# Patient Record
Sex: MALE | Race: BLACK OR AFRICAN AMERICAN | Employment: FULL TIME | URBAN - METROPOLITAN AREA
[De-identification: names, ages, dates, MRNs, and addresses within clinical notes are randomized per-mention and may not be internally consistent; named-entity substitution may affect disease eponyms.]

---

## 2017-04-18 RX ORDER — AMLODIPINE BESYLATE 5 MG/1
TABLET ORAL
Qty: 30 TABLET | Refills: 0 | Status: SHIPPED | OUTPATIENT
Start: 2017-04-18 | End: 2017-05-18 | Stop reason: SDUPTHER

## 2017-04-18 RX ORDER — OLMESARTAN MEDOXOMIL AND HYDROCHLOROTHIAZIDE 40/25 40; 25 MG/1; MG/1
TABLET ORAL
Qty: 30 TABLET | Refills: 0 | Status: SHIPPED | OUTPATIENT
Start: 2017-04-18 | End: 2017-05-18 | Stop reason: SDUPTHER

## 2017-04-18 RX ORDER — ATORVASTATIN CALCIUM 20 MG/1
TABLET, FILM COATED ORAL
Qty: 30 TABLET | Refills: 0 | Status: SHIPPED | OUTPATIENT
Start: 2017-04-18 | End: 2017-05-18 | Stop reason: SDUPTHER

## 2017-04-25 ENCOUNTER — OFFICE VISIT (OUTPATIENT)
Dept: INTERNAL MEDICINE CLINIC | Age: 57
End: 2017-04-25

## 2017-04-25 VITALS
HEIGHT: 72 IN | BODY MASS INDEX: 35.35 KG/M2 | WEIGHT: 261 LBS | HEART RATE: 74 BPM | TEMPERATURE: 97.8 F | SYSTOLIC BLOOD PRESSURE: 138 MMHG | DIASTOLIC BLOOD PRESSURE: 84 MMHG | OXYGEN SATURATION: 96 %

## 2017-04-25 DIAGNOSIS — I10 ESSENTIAL HYPERTENSION: Primary | ICD-10-CM

## 2017-04-25 DIAGNOSIS — R73.9 HYPERGLYCEMIA: ICD-10-CM

## 2017-04-25 DIAGNOSIS — E78.2 MIXED HYPERLIPIDEMIA: ICD-10-CM

## 2017-04-25 DIAGNOSIS — G47.33 OBSTRUCTIVE SLEEP APNEA SYNDROME: ICD-10-CM

## 2017-04-25 DIAGNOSIS — Z80.42 FAMILY HISTORY OF PROSTATE CANCER: ICD-10-CM

## 2017-04-25 LAB
BILIRUBIN URINE: NEGATIVE
BLOOD, URINE: NEGATIVE
CLARITY: CLEAR
COLOR: YELLOW
GLUCOSE URINE: NEGATIVE MG/DL
KETONES, URINE: NEGATIVE MG/DL
LEUKOCYTE ESTERASE, URINE: NEGATIVE
MICROSCOPIC EXAMINATION: NORMAL
NITRITE, URINE: NEGATIVE
PH UA: 6.5
PROTEIN UA: NEGATIVE MG/DL
SPECIFIC GRAVITY UA: 1.02
URINE TYPE: NORMAL
UROBILINOGEN, URINE: 1 E.U./DL

## 2017-04-25 PROCEDURE — 99214 OFFICE O/P EST MOD 30 MIN: CPT | Performed by: INTERNAL MEDICINE

## 2017-04-25 ASSESSMENT — ENCOUNTER SYMPTOMS
GASTROINTESTINAL NEGATIVE: 1
EYES NEGATIVE: 1
RESPIRATORY NEGATIVE: 1

## 2017-04-25 ASSESSMENT — PATIENT HEALTH QUESTIONNAIRE - PHQ9
SUM OF ALL RESPONSES TO PHQ9 QUESTIONS 1 & 2: 0
2. FEELING DOWN, DEPRESSED OR HOPELESS: 0
SUM OF ALL RESPONSES TO PHQ QUESTIONS 1-9: 0
1. LITTLE INTEREST OR PLEASURE IN DOING THINGS: 0

## 2017-04-26 LAB
A/G RATIO: 1.7 (ref 1.1–2.2)
ALBUMIN SERPL-MCNC: 4.5 G/DL (ref 3.4–5)
ALP BLD-CCNC: 92 U/L (ref 40–129)
ALT SERPL-CCNC: 47 U/L (ref 10–40)
ANION GAP SERPL CALCULATED.3IONS-SCNC: 14 MMOL/L (ref 3–16)
AST SERPL-CCNC: 30 U/L (ref 15–37)
BILIRUB SERPL-MCNC: 0.5 MG/DL (ref 0–1)
BUN BLDV-MCNC: 16 MG/DL (ref 7–20)
CALCIUM SERPL-MCNC: 9.9 MG/DL (ref 8.3–10.6)
CHLORIDE BLD-SCNC: 99 MMOL/L (ref 99–110)
CHOLESTEROL, TOTAL: 143 MG/DL (ref 0–199)
CO2: 30 MMOL/L (ref 21–32)
CREAT SERPL-MCNC: 1.1 MG/DL (ref 0.9–1.3)
ESTIMATED AVERAGE GLUCOSE: 128.4 MG/DL
GFR AFRICAN AMERICAN: >60
GFR NON-AFRICAN AMERICAN: >60
GLOBULIN: 2.7 G/DL
GLUCOSE BLD-MCNC: 108 MG/DL (ref 70–99)
HBA1C MFR BLD: 6.1 %
HDLC SERPL-MCNC: 31 MG/DL (ref 40–60)
LDL CHOLESTEROL CALCULATED: 68 MG/DL
POTASSIUM SERPL-SCNC: 3.8 MMOL/L (ref 3.5–5.1)
PROSTATE SPECIFIC ANTIGEN: 3.82 NG/ML (ref 0–4)
SODIUM BLD-SCNC: 143 MMOL/L (ref 136–145)
TOTAL PROTEIN: 7.2 G/DL (ref 6.4–8.2)
TRIGL SERPL-MCNC: 221 MG/DL (ref 0–150)
VLDLC SERPL CALC-MCNC: 44 MG/DL

## 2017-04-27 ENCOUNTER — TELEPHONE (OUTPATIENT)
Dept: INTERNAL MEDICINE CLINIC | Age: 57
End: 2017-04-27

## 2017-04-27 DIAGNOSIS — G47.33 OBSTRUCTIVE SLEEP APNEA SYNDROME: Primary | ICD-10-CM

## 2017-05-19 RX ORDER — AMLODIPINE BESYLATE 5 MG/1
TABLET ORAL
Qty: 30 TABLET | Refills: 3 | Status: SHIPPED | OUTPATIENT
Start: 2017-05-19 | End: 2017-09-26 | Stop reason: SDUPTHER

## 2017-05-19 RX ORDER — ATORVASTATIN CALCIUM 20 MG/1
TABLET, FILM COATED ORAL
Qty: 30 TABLET | Refills: 3 | Status: SHIPPED | OUTPATIENT
Start: 2017-05-19 | End: 2017-09-26 | Stop reason: SDUPTHER

## 2017-05-19 RX ORDER — OLMESARTAN MEDOXOMIL AND HYDROCHLOROTHIAZIDE 40/25 40; 25 MG/1; MG/1
TABLET ORAL
Qty: 30 TABLET | Refills: 3 | Status: SHIPPED | OUTPATIENT
Start: 2017-05-19 | End: 2017-09-26

## 2017-09-26 ENCOUNTER — TELEPHONE (OUTPATIENT)
Dept: INTERNAL MEDICINE CLINIC | Age: 57
End: 2017-09-26

## 2017-09-26 ENCOUNTER — OFFICE VISIT (OUTPATIENT)
Dept: INTERNAL MEDICINE CLINIC | Age: 57
End: 2017-09-26

## 2017-09-26 VITALS
WEIGHT: 259 LBS | DIASTOLIC BLOOD PRESSURE: 90 MMHG | BODY MASS INDEX: 35.08 KG/M2 | HEIGHT: 72 IN | OXYGEN SATURATION: 98 % | HEART RATE: 73 BPM | SYSTOLIC BLOOD PRESSURE: 150 MMHG

## 2017-09-26 DIAGNOSIS — I10 ESSENTIAL HYPERTENSION: Primary | ICD-10-CM

## 2017-09-26 DIAGNOSIS — E11.9 TYPE 2 DIABETES MELLITUS WITHOUT COMPLICATION, WITHOUT LONG-TERM CURRENT USE OF INSULIN (HCC): ICD-10-CM

## 2017-09-26 DIAGNOSIS — R73.9 HYPERGLYCEMIA: ICD-10-CM

## 2017-09-26 DIAGNOSIS — E78.2 MIXED HYPERLIPIDEMIA: ICD-10-CM

## 2017-09-26 LAB
A/G RATIO: 1.4 (ref 1.1–2.2)
ALBUMIN SERPL-MCNC: 4.6 G/DL (ref 3.4–5)
ALP BLD-CCNC: 87 U/L (ref 40–129)
ALT SERPL-CCNC: 52 U/L (ref 10–40)
ANION GAP SERPL CALCULATED.3IONS-SCNC: 16 MMOL/L (ref 3–16)
AST SERPL-CCNC: 38 U/L (ref 15–37)
BILIRUB SERPL-MCNC: 0.6 MG/DL (ref 0–1)
BUN BLDV-MCNC: 16 MG/DL (ref 7–20)
CALCIUM SERPL-MCNC: 10.2 MG/DL (ref 8.3–10.6)
CHLORIDE BLD-SCNC: 96 MMOL/L (ref 99–110)
CHOLESTEROL, TOTAL: 141 MG/DL (ref 0–199)
CO2: 30 MMOL/L (ref 21–32)
CREAT SERPL-MCNC: 1.2 MG/DL (ref 0.9–1.3)
GFR AFRICAN AMERICAN: >60
GFR NON-AFRICAN AMERICAN: >60
GLOBULIN: 3.4 G/DL
GLUCOSE BLD-MCNC: 99 MG/DL (ref 70–99)
HDLC SERPL-MCNC: 33 MG/DL (ref 40–60)
HEPATITIS C ANTIBODY INTERPRETATION: NORMAL
LDL CHOLESTEROL CALCULATED: 75 MG/DL
POTASSIUM SERPL-SCNC: 4.4 MMOL/L (ref 3.5–5.1)
SODIUM BLD-SCNC: 142 MMOL/L (ref 136–145)
TOTAL PROTEIN: 8 G/DL (ref 6.4–8.2)
TRIGL SERPL-MCNC: 167 MG/DL (ref 0–150)
VLDLC SERPL CALC-MCNC: 33 MG/DL

## 2017-09-26 PROCEDURE — 99214 OFFICE O/P EST MOD 30 MIN: CPT | Performed by: INTERNAL MEDICINE

## 2017-09-26 RX ORDER — ATORVASTATIN CALCIUM 20 MG/1
20 TABLET, FILM COATED ORAL DAILY
Qty: 30 TABLET | Refills: 5 | Status: SHIPPED | OUTPATIENT
Start: 2017-09-26 | End: 2018-04-03 | Stop reason: SDUPTHER

## 2017-09-26 RX ORDER — AMLODIPINE BESYLATE 5 MG/1
5 TABLET ORAL DAILY
Qty: 30 TABLET | Refills: 5 | Status: SHIPPED | OUTPATIENT
Start: 2017-09-26 | End: 2018-04-03 | Stop reason: SDUPTHER

## 2017-09-26 ASSESSMENT — ENCOUNTER SYMPTOMS
GASTROINTESTINAL NEGATIVE: 1
EYES NEGATIVE: 1
RESPIRATORY NEGATIVE: 1

## 2017-09-27 LAB
ESTIMATED AVERAGE GLUCOSE: 128.4 MG/DL
HBA1C MFR BLD: 6.1 %

## 2017-09-29 RX ORDER — OLMESARTAN MEDOXOMIL AND HYDROCHLOROTHIAZIDE 40/25 40; 25 MG/1; MG/1
TABLET ORAL
Qty: 30 TABLET | Refills: 0 | Status: SHIPPED | OUTPATIENT
Start: 2017-09-29 | End: 2017-10-28 | Stop reason: SDUPTHER

## 2017-10-30 RX ORDER — OLMESARTAN MEDOXOMIL AND HYDROCHLOROTHIAZIDE 40/25 40; 25 MG/1; MG/1
TABLET ORAL
Qty: 30 TABLET | Refills: 0 | Status: SHIPPED | OUTPATIENT
Start: 2017-10-30 | End: 2017-11-28 | Stop reason: SDUPTHER

## 2017-11-07 ENCOUNTER — TELEPHONE (OUTPATIENT)
Dept: INTERNAL MEDICINE CLINIC | Age: 57
End: 2017-11-07

## 2017-11-07 NOTE — TELEPHONE ENCOUNTER
Patient needs a new CPAP machine     [facility] patients is requesting a new order   documentation showing he's been using and benefiting from the machine and why he needs a new machine

## 2017-11-17 ENCOUNTER — TELEPHONE (OUTPATIENT)
Dept: INTERNAL MEDICINE CLINIC | Age: 57
End: 2017-11-17

## 2017-12-01 ENCOUNTER — TELEPHONE (OUTPATIENT)
Dept: INTERNAL MEDICINE CLINIC | Age: 57
End: 2017-12-01

## 2017-12-01 RX ORDER — OLMESARTAN MEDOXOMIL AND HYDROCHLOROTHIAZIDE 40/25 40; 25 MG/1; MG/1
TABLET ORAL
Qty: 30 TABLET | Refills: 0 | Status: SHIPPED | OUTPATIENT
Start: 2017-12-01 | End: 2017-12-30 | Stop reason: SDUPTHER

## 2017-12-01 NOTE — TELEPHONE ENCOUNTER
Patient needs a refill on the following medication   olmesartan-hydrochlorothiazide (BENICAR HCT) 40-25 MG per tablet

## 2017-12-21 ENCOUNTER — OFFICE VISIT (OUTPATIENT)
Dept: PULMONOLOGY | Age: 57
End: 2017-12-21

## 2017-12-21 VITALS
SYSTOLIC BLOOD PRESSURE: 130 MMHG | DIASTOLIC BLOOD PRESSURE: 88 MMHG | HEART RATE: 79 BPM | RESPIRATION RATE: 16 BRPM | WEIGHT: 261 LBS | OXYGEN SATURATION: 97 % | HEIGHT: 72 IN | BODY MASS INDEX: 35.35 KG/M2

## 2017-12-21 DIAGNOSIS — G47.33 OBSTRUCTIVE SLEEP APNEA SYNDROME: Primary | ICD-10-CM

## 2017-12-21 PROCEDURE — 99203 OFFICE O/P NEW LOW 30 MIN: CPT | Performed by: INTERNAL MEDICINE

## 2018-01-02 RX ORDER — OLMESARTAN MEDOXOMIL AND HYDROCHLOROTHIAZIDE 40/25 40; 25 MG/1; MG/1
TABLET ORAL
Qty: 30 TABLET | Refills: 0 | Status: SHIPPED | OUTPATIENT
Start: 2018-01-02 | End: 2018-01-09 | Stop reason: SDUPTHER

## 2018-01-09 ENCOUNTER — OFFICE VISIT (OUTPATIENT)
Dept: INTERNAL MEDICINE CLINIC | Age: 58
End: 2018-01-09

## 2018-01-09 VITALS
DIASTOLIC BLOOD PRESSURE: 100 MMHG | OXYGEN SATURATION: 96 % | SYSTOLIC BLOOD PRESSURE: 134 MMHG | HEART RATE: 79 BPM | WEIGHT: 261 LBS | HEIGHT: 72 IN | BODY MASS INDEX: 35.35 KG/M2

## 2018-01-09 DIAGNOSIS — Z83.3 FAMILY HISTORY OF DIABETES MELLITUS: ICD-10-CM

## 2018-01-09 DIAGNOSIS — E78.2 MIXED HYPERLIPIDEMIA: ICD-10-CM

## 2018-01-09 DIAGNOSIS — I10 ESSENTIAL HYPERTENSION: Primary | ICD-10-CM

## 2018-01-09 DIAGNOSIS — Z80.42 FAMILY HISTORY OF PROSTATE CANCER: ICD-10-CM

## 2018-01-09 DIAGNOSIS — R73.9 HYPERGLYCEMIA: ICD-10-CM

## 2018-01-09 LAB
BILIRUBIN URINE: NEGATIVE
BLOOD, URINE: NEGATIVE
CLARITY: CLEAR
COLOR: YELLOW
GLUCOSE URINE: NEGATIVE MG/DL
KETONES, URINE: NEGATIVE MG/DL
LEUKOCYTE ESTERASE, URINE: NEGATIVE
MICROSCOPIC EXAMINATION: NORMAL
NITRITE, URINE: NEGATIVE
PH UA: 6
PROTEIN UA: NEGATIVE MG/DL
SPECIFIC GRAVITY UA: 1.02
URINE TYPE: NORMAL
UROBILINOGEN, URINE: 1 E.U./DL

## 2018-01-09 PROCEDURE — 99214 OFFICE O/P EST MOD 30 MIN: CPT | Performed by: INTERNAL MEDICINE

## 2018-01-09 RX ORDER — OLMESARTAN MEDOXOMIL AND HYDROCHLOROTHIAZIDE 40/25 40; 25 MG/1; MG/1
1 TABLET ORAL DAILY
Qty: 30 TABLET | Refills: 5 | Status: SHIPPED | OUTPATIENT
Start: 2018-01-09 | End: 2018-08-10 | Stop reason: SDUPTHER

## 2018-01-09 ASSESSMENT — ENCOUNTER SYMPTOMS
RESPIRATORY NEGATIVE: 1
GASTROINTESTINAL NEGATIVE: 1
EYES NEGATIVE: 1

## 2018-01-10 LAB
ESTIMATED AVERAGE GLUCOSE: 131.2 MG/DL
HBA1C MFR BLD: 6.2 %

## 2018-01-18 ENCOUNTER — TELEPHONE (OUTPATIENT)
Dept: PULMONOLOGY | Age: 58
End: 2018-01-18

## 2018-04-04 RX ORDER — AMLODIPINE BESYLATE 5 MG/1
5 TABLET ORAL DAILY
Qty: 30 TABLET | Refills: 0 | Status: SHIPPED | OUTPATIENT
Start: 2018-04-04 | End: 2018-05-05 | Stop reason: SDUPTHER

## 2018-04-04 RX ORDER — ATORVASTATIN CALCIUM 20 MG/1
20 TABLET, FILM COATED ORAL DAILY
Qty: 30 TABLET | Refills: 0 | Status: SHIPPED | OUTPATIENT
Start: 2018-04-04 | End: 2018-05-05 | Stop reason: SDUPTHER

## 2018-05-07 RX ORDER — ATORVASTATIN CALCIUM 20 MG/1
20 TABLET, FILM COATED ORAL DAILY
Qty: 30 TABLET | Refills: 0 | Status: SHIPPED | OUTPATIENT
Start: 2018-05-07 | End: 2018-06-07 | Stop reason: SDUPTHER

## 2018-05-07 RX ORDER — AMLODIPINE BESYLATE 5 MG/1
5 TABLET ORAL DAILY
Qty: 30 TABLET | Refills: 0 | Status: SHIPPED | OUTPATIENT
Start: 2018-05-07 | End: 2018-06-07 | Stop reason: SDUPTHER

## 2018-05-09 ENCOUNTER — OFFICE VISIT (OUTPATIENT)
Dept: INTERNAL MEDICINE CLINIC | Age: 58
End: 2018-05-09

## 2018-05-09 VITALS
SYSTOLIC BLOOD PRESSURE: 160 MMHG | OXYGEN SATURATION: 95 % | BODY MASS INDEX: 35.62 KG/M2 | HEART RATE: 73 BPM | DIASTOLIC BLOOD PRESSURE: 100 MMHG | HEIGHT: 72 IN | WEIGHT: 263 LBS

## 2018-05-09 DIAGNOSIS — G47.33 OBSTRUCTIVE SLEEP APNEA SYNDROME: ICD-10-CM

## 2018-05-09 DIAGNOSIS — E78.2 MIXED HYPERLIPIDEMIA: ICD-10-CM

## 2018-05-09 DIAGNOSIS — R73.9 HYPERGLYCEMIA: ICD-10-CM

## 2018-05-09 DIAGNOSIS — I10 ESSENTIAL HYPERTENSION: Primary | ICD-10-CM

## 2018-05-09 LAB
A/G RATIO: 1.7 (ref 1.1–2.2)
ALBUMIN SERPL-MCNC: 4.8 G/DL (ref 3.4–5)
ALP BLD-CCNC: 80 U/L (ref 40–129)
ALT SERPL-CCNC: 48 U/L (ref 10–40)
ANION GAP SERPL CALCULATED.3IONS-SCNC: 14 MMOL/L (ref 3–16)
AST SERPL-CCNC: 35 U/L (ref 15–37)
BILIRUB SERPL-MCNC: 0.7 MG/DL (ref 0–1)
BUN BLDV-MCNC: 13 MG/DL (ref 7–20)
CALCIUM SERPL-MCNC: 9.9 MG/DL (ref 8.3–10.6)
CHLORIDE BLD-SCNC: 99 MMOL/L (ref 99–110)
CO2: 30 MMOL/L (ref 21–32)
CREAT SERPL-MCNC: 1.1 MG/DL (ref 0.9–1.3)
GFR AFRICAN AMERICAN: >60
GFR NON-AFRICAN AMERICAN: >60
GLOBULIN: 2.8 G/DL
GLUCOSE BLD-MCNC: 111 MG/DL (ref 70–99)
POTASSIUM SERPL-SCNC: 3.7 MMOL/L (ref 3.5–5.1)
SODIUM BLD-SCNC: 143 MMOL/L (ref 136–145)
TOTAL PROTEIN: 7.6 G/DL (ref 6.4–8.2)

## 2018-05-09 PROCEDURE — 99214 OFFICE O/P EST MOD 30 MIN: CPT | Performed by: INTERNAL MEDICINE

## 2018-05-09 RX ORDER — SPIRONOLACTONE 25 MG/1
25 TABLET ORAL DAILY
Qty: 30 TABLET | Refills: 3 | Status: SHIPPED | OUTPATIENT
Start: 2018-05-09 | End: 2018-09-07 | Stop reason: SDUPTHER

## 2018-05-09 ASSESSMENT — ENCOUNTER SYMPTOMS
EYES NEGATIVE: 1
GASTROINTESTINAL NEGATIVE: 1
RESPIRATORY NEGATIVE: 1

## 2018-05-09 ASSESSMENT — PATIENT HEALTH QUESTIONNAIRE - PHQ9
SUM OF ALL RESPONSES TO PHQ QUESTIONS 1-9: 0
2. FEELING DOWN, DEPRESSED OR HOPELESS: 0
SUM OF ALL RESPONSES TO PHQ9 QUESTIONS 1 & 2: 0
1. LITTLE INTEREST OR PLEASURE IN DOING THINGS: 0

## 2018-05-10 LAB
ESTIMATED AVERAGE GLUCOSE: 131.2 MG/DL
HBA1C MFR BLD: 6.2 %

## 2018-06-08 RX ORDER — AMLODIPINE BESYLATE 5 MG/1
5 TABLET ORAL DAILY
Qty: 30 TABLET | Refills: 0 | Status: SHIPPED | OUTPATIENT
Start: 2018-06-08 | End: 2018-07-08 | Stop reason: SDUPTHER

## 2018-06-08 RX ORDER — ATORVASTATIN CALCIUM 20 MG/1
20 TABLET, FILM COATED ORAL DAILY
Qty: 30 TABLET | Refills: 0 | Status: SHIPPED | OUTPATIENT
Start: 2018-06-08 | End: 2018-07-08 | Stop reason: SDUPTHER

## 2018-07-10 RX ORDER — ATORVASTATIN CALCIUM 20 MG/1
20 TABLET, FILM COATED ORAL DAILY
Qty: 30 TABLET | Refills: 5 | Status: SHIPPED | OUTPATIENT
Start: 2018-07-10 | End: 2019-01-12 | Stop reason: SDUPTHER

## 2018-07-10 RX ORDER — AMLODIPINE BESYLATE 5 MG/1
5 TABLET ORAL DAILY
Qty: 30 TABLET | Refills: 5 | Status: SHIPPED | OUTPATIENT
Start: 2018-07-10 | End: 2019-01-12 | Stop reason: SDUPTHER

## 2018-08-10 RX ORDER — OLMESARTAN MEDOXOMIL AND HYDROCHLOROTHIAZIDE 40/25 40; 25 MG/1; MG/1
1 TABLET ORAL DAILY
Qty: 30 TABLET | Refills: 0 | Status: SHIPPED | OUTPATIENT
Start: 2018-08-10 | End: 2018-09-07 | Stop reason: SDUPTHER

## 2018-08-13 RX ORDER — OLMESARTAN MEDOXOMIL AND HYDROCHLOROTHIAZIDE 40/25 40; 25 MG/1; MG/1
1 TABLET ORAL DAILY
Qty: 30 TABLET | Refills: 0 | Status: SHIPPED | OUTPATIENT
Start: 2018-08-13 | End: 2021-11-19 | Stop reason: SDUPTHER

## 2018-09-10 RX ORDER — SPIRONOLACTONE 25 MG/1
25 TABLET ORAL DAILY
Qty: 30 TABLET | Refills: 0 | Status: SHIPPED | OUTPATIENT
Start: 2018-09-10 | End: 2018-10-09 | Stop reason: SDUPTHER

## 2018-09-10 RX ORDER — OLMESARTAN MEDOXOMIL AND HYDROCHLOROTHIAZIDE 40/25 40; 25 MG/1; MG/1
1 TABLET ORAL DAILY
Qty: 30 TABLET | Refills: 0 | Status: SHIPPED | OUTPATIENT
Start: 2018-09-10 | End: 2018-09-12

## 2018-09-12 ENCOUNTER — OFFICE VISIT (OUTPATIENT)
Dept: INTERNAL MEDICINE CLINIC | Age: 58
End: 2018-09-12

## 2018-09-12 VITALS
BODY MASS INDEX: 33.59 KG/M2 | WEIGHT: 248 LBS | HEART RATE: 80 BPM | SYSTOLIC BLOOD PRESSURE: 130 MMHG | OXYGEN SATURATION: 98 % | HEIGHT: 72 IN | DIASTOLIC BLOOD PRESSURE: 88 MMHG

## 2018-09-12 DIAGNOSIS — E78.2 MIXED HYPERLIPIDEMIA: ICD-10-CM

## 2018-09-12 DIAGNOSIS — R73.9 HYPERGLYCEMIA: ICD-10-CM

## 2018-09-12 DIAGNOSIS — I10 ESSENTIAL HYPERTENSION: ICD-10-CM

## 2018-09-12 DIAGNOSIS — R73.9 HYPERGLYCEMIA: Primary | ICD-10-CM

## 2018-09-12 LAB
ALBUMIN SERPL-MCNC: 4.9 G/DL (ref 3.4–5)
ANION GAP SERPL CALCULATED.3IONS-SCNC: 16 MMOL/L (ref 3–16)
BILIRUBIN URINE: NEGATIVE
BLOOD, URINE: NEGATIVE
BUN BLDV-MCNC: 15 MG/DL (ref 7–20)
CALCIUM SERPL-MCNC: 10.4 MG/DL (ref 8.3–10.6)
CHLORIDE BLD-SCNC: 96 MMOL/L (ref 99–110)
CLARITY: CLEAR
CO2: 28 MMOL/L (ref 21–32)
COLOR: YELLOW
CREAT SERPL-MCNC: 1.1 MG/DL (ref 0.9–1.3)
GFR AFRICAN AMERICAN: >60
GFR NON-AFRICAN AMERICAN: >60
GLUCOSE BLD-MCNC: 83 MG/DL (ref 70–99)
GLUCOSE URINE: NEGATIVE MG/DL
KETONES, URINE: NEGATIVE MG/DL
LEUKOCYTE ESTERASE, URINE: NEGATIVE
MICROSCOPIC EXAMINATION: NORMAL
NITRITE, URINE: NEGATIVE
PH UA: 6.5
PHOSPHORUS: 4.5 MG/DL (ref 2.5–4.9)
POTASSIUM SERPL-SCNC: 4.1 MMOL/L (ref 3.5–5.1)
PROTEIN UA: NEGATIVE MG/DL
SODIUM BLD-SCNC: 140 MMOL/L (ref 136–145)
SPECIFIC GRAVITY UA: 1.02
URINE TYPE: NORMAL
UROBILINOGEN, URINE: 0.2 E.U./DL

## 2018-09-12 PROCEDURE — 99214 OFFICE O/P EST MOD 30 MIN: CPT | Performed by: INTERNAL MEDICINE

## 2018-09-12 ASSESSMENT — PATIENT HEALTH QUESTIONNAIRE - PHQ9
1. LITTLE INTEREST OR PLEASURE IN DOING THINGS: 0
SUM OF ALL RESPONSES TO PHQ QUESTIONS 1-9: 0
2. FEELING DOWN, DEPRESSED OR HOPELESS: 0
SUM OF ALL RESPONSES TO PHQ9 QUESTIONS 1 & 2: 0
SUM OF ALL RESPONSES TO PHQ QUESTIONS 1-9: 0

## 2018-09-12 ASSESSMENT — ENCOUNTER SYMPTOMS
RESPIRATORY NEGATIVE: 1
EYES NEGATIVE: 1
GASTROINTESTINAL NEGATIVE: 1

## 2018-09-13 LAB
ESTIMATED AVERAGE GLUCOSE: 128.4 MG/DL
HBA1C MFR BLD: 6.1 %

## 2018-10-09 RX ORDER — SPIRONOLACTONE 25 MG/1
25 TABLET ORAL DAILY
Qty: 30 TABLET | Refills: 0 | Status: SHIPPED | OUTPATIENT
Start: 2018-10-09 | End: 2018-11-07 | Stop reason: SDUPTHER

## 2018-10-09 RX ORDER — OLMESARTAN MEDOXOMIL AND HYDROCHLOROTHIAZIDE 40/25 40; 25 MG/1; MG/1
1 TABLET ORAL DAILY
Qty: 30 TABLET | Refills: 0 | Status: SHIPPED | OUTPATIENT
Start: 2018-10-09 | End: 2018-11-07 | Stop reason: SDUPTHER

## 2018-11-09 RX ORDER — SPIRONOLACTONE 25 MG/1
25 TABLET ORAL DAILY
Qty: 30 TABLET | Refills: 0 | Status: SHIPPED | OUTPATIENT
Start: 2018-11-09 | End: 2018-12-11 | Stop reason: SDUPTHER

## 2018-11-09 RX ORDER — OLMESARTAN MEDOXOMIL AND HYDROCHLOROTHIAZIDE 40/25 40; 25 MG/1; MG/1
1 TABLET ORAL DAILY
Qty: 30 TABLET | Refills: 0 | Status: SHIPPED | OUTPATIENT
Start: 2018-11-09 | End: 2018-12-11 | Stop reason: SDUPTHER

## 2018-12-12 RX ORDER — OLMESARTAN MEDOXOMIL AND HYDROCHLOROTHIAZIDE 40/25 40; 25 MG/1; MG/1
1 TABLET ORAL DAILY
Qty: 30 TABLET | Refills: 0 | Status: SHIPPED | OUTPATIENT
Start: 2018-12-12 | End: 2019-01-12 | Stop reason: SDUPTHER

## 2018-12-12 RX ORDER — SPIRONOLACTONE 25 MG/1
25 TABLET ORAL DAILY
Qty: 30 TABLET | Refills: 0 | Status: SHIPPED | OUTPATIENT
Start: 2018-12-12 | End: 2019-01-12 | Stop reason: SDUPTHER

## 2018-12-17 ENCOUNTER — OFFICE VISIT (OUTPATIENT)
Dept: PULMONOLOGY | Age: 58
End: 2018-12-17
Payer: COMMERCIAL

## 2018-12-17 VITALS
OXYGEN SATURATION: 97 % | WEIGHT: 253.3 LBS | SYSTOLIC BLOOD PRESSURE: 136 MMHG | HEART RATE: 80 BPM | DIASTOLIC BLOOD PRESSURE: 90 MMHG | BODY MASS INDEX: 34.31 KG/M2 | HEIGHT: 72 IN | RESPIRATION RATE: 18 BRPM

## 2018-12-17 DIAGNOSIS — G47.33 OBSTRUCTIVE SLEEP APNEA SYNDROME: Primary | ICD-10-CM

## 2018-12-17 PROCEDURE — 99213 OFFICE O/P EST LOW 20 MIN: CPT | Performed by: INTERNAL MEDICINE

## 2019-01-14 ENCOUNTER — TELEPHONE (OUTPATIENT)
Dept: INTERNAL MEDICINE CLINIC | Age: 59
End: 2019-01-14

## 2019-02-20 ENCOUNTER — OFFICE VISIT (OUTPATIENT)
Dept: INTERNAL MEDICINE CLINIC | Age: 59
End: 2019-02-20
Payer: COMMERCIAL

## 2019-02-20 VITALS
HEART RATE: 88 BPM | DIASTOLIC BLOOD PRESSURE: 86 MMHG | HEIGHT: 72 IN | OXYGEN SATURATION: 98 % | SYSTOLIC BLOOD PRESSURE: 130 MMHG | WEIGHT: 256 LBS | BODY MASS INDEX: 34.67 KG/M2

## 2019-02-20 DIAGNOSIS — R73.9 HYPERGLYCEMIA: ICD-10-CM

## 2019-02-20 DIAGNOSIS — E11.9 TYPE 2 DIABETES MELLITUS WITHOUT COMPLICATION, WITHOUT LONG-TERM CURRENT USE OF INSULIN (HCC): ICD-10-CM

## 2019-02-20 DIAGNOSIS — I10 ESSENTIAL HYPERTENSION: ICD-10-CM

## 2019-02-20 DIAGNOSIS — Z12.5 PROSTATE CANCER SCREENING: ICD-10-CM

## 2019-02-20 DIAGNOSIS — E78.2 MIXED HYPERLIPIDEMIA: ICD-10-CM

## 2019-02-20 DIAGNOSIS — I10 ESSENTIAL HYPERTENSION: Primary | ICD-10-CM

## 2019-02-20 LAB
A/G RATIO: 1.6 (ref 1.1–2.2)
ALBUMIN SERPL-MCNC: 4.7 G/DL (ref 3.4–5)
ALP BLD-CCNC: 75 U/L (ref 40–129)
ALT SERPL-CCNC: 61 U/L (ref 10–40)
ANION GAP SERPL CALCULATED.3IONS-SCNC: 16 MMOL/L (ref 3–16)
AST SERPL-CCNC: 45 U/L (ref 15–37)
BILIRUB SERPL-MCNC: 0.5 MG/DL (ref 0–1)
BILIRUBIN URINE: NEGATIVE
BLOOD, URINE: NEGATIVE
BUN BLDV-MCNC: 15 MG/DL (ref 7–20)
CALCIUM SERPL-MCNC: 10.1 MG/DL (ref 8.3–10.6)
CHLORIDE BLD-SCNC: 101 MMOL/L (ref 99–110)
CLARITY: CLEAR
CO2: 29 MMOL/L (ref 21–32)
COLOR: YELLOW
CREAT SERPL-MCNC: 1.1 MG/DL (ref 0.9–1.3)
GFR AFRICAN AMERICAN: >60
GFR NON-AFRICAN AMERICAN: >60
GLOBULIN: 3 G/DL
GLUCOSE BLD-MCNC: 104 MG/DL (ref 70–99)
GLUCOSE URINE: NEGATIVE MG/DL
KETONES, URINE: NEGATIVE MG/DL
LEUKOCYTE ESTERASE, URINE: NEGATIVE
MICROSCOPIC EXAMINATION: NORMAL
NITRITE, URINE: NEGATIVE
PH UA: 7
POTASSIUM SERPL-SCNC: 4.4 MMOL/L (ref 3.5–5.1)
PROSTATE SPECIFIC ANTIGEN: 7.47 NG/ML (ref 0–4)
PROTEIN UA: NEGATIVE MG/DL
SODIUM BLD-SCNC: 146 MMOL/L (ref 136–145)
SPECIFIC GRAVITY UA: 1.02
TOTAL PROTEIN: 7.7 G/DL (ref 6.4–8.2)
URINE TYPE: NORMAL
UROBILINOGEN, URINE: 1 E.U./DL

## 2019-02-20 PROCEDURE — 99214 OFFICE O/P EST MOD 30 MIN: CPT | Performed by: INTERNAL MEDICINE

## 2019-02-20 ASSESSMENT — PATIENT HEALTH QUESTIONNAIRE - PHQ9
2. FEELING DOWN, DEPRESSED OR HOPELESS: 0
SUM OF ALL RESPONSES TO PHQ9 QUESTIONS 1 & 2: 0
1. LITTLE INTEREST OR PLEASURE IN DOING THINGS: 0
SUM OF ALL RESPONSES TO PHQ QUESTIONS 1-9: 0
SUM OF ALL RESPONSES TO PHQ QUESTIONS 1-9: 0

## 2019-02-20 ASSESSMENT — ENCOUNTER SYMPTOMS
EYES NEGATIVE: 1
RESPIRATORY NEGATIVE: 1
GASTROINTESTINAL NEGATIVE: 1

## 2019-02-21 LAB
ESTIMATED AVERAGE GLUCOSE: 139.9 MG/DL
HBA1C MFR BLD: 6.5 %

## 2019-05-01 DIAGNOSIS — R97.20 ELEVATED PSA: Primary | ICD-10-CM

## 2019-05-08 ENCOUNTER — TELEPHONE (OUTPATIENT)
Dept: INTERNAL MEDICINE CLINIC | Age: 59
End: 2019-05-08

## 2019-06-27 RX ORDER — ATORVASTATIN CALCIUM 20 MG/1
20 TABLET, FILM COATED ORAL DAILY
Qty: 30 TABLET | Refills: 0 | Status: SHIPPED | OUTPATIENT
Start: 2019-06-27 | End: 2019-07-28 | Stop reason: SDUPTHER

## 2019-07-02 ENCOUNTER — OFFICE VISIT (OUTPATIENT)
Dept: INTERNAL MEDICINE CLINIC | Age: 59
End: 2019-07-02
Payer: COMMERCIAL

## 2019-07-02 VITALS
DIASTOLIC BLOOD PRESSURE: 80 MMHG | OXYGEN SATURATION: 98 % | HEART RATE: 76 BPM | WEIGHT: 243 LBS | SYSTOLIC BLOOD PRESSURE: 120 MMHG | HEIGHT: 70 IN | BODY MASS INDEX: 34.79 KG/M2

## 2019-07-02 DIAGNOSIS — E11.9 TYPE 2 DIABETES MELLITUS WITHOUT COMPLICATION, WITHOUT LONG-TERM CURRENT USE OF INSULIN (HCC): Primary | ICD-10-CM

## 2019-07-02 LAB
CHP ED QC CHECK: NORMAL
GLUCOSE BLD-MCNC: 107 MG/DL
HBA1C MFR BLD: 6.1 %

## 2019-07-02 PROCEDURE — 99214 OFFICE O/P EST MOD 30 MIN: CPT | Performed by: INTERNAL MEDICINE

## 2019-07-02 PROCEDURE — 83036 HEMOGLOBIN GLYCOSYLATED A1C: CPT | Performed by: INTERNAL MEDICINE

## 2019-07-02 PROCEDURE — 82962 GLUCOSE BLOOD TEST: CPT | Performed by: INTERNAL MEDICINE

## 2019-07-02 ASSESSMENT — ENCOUNTER SYMPTOMS
EYES NEGATIVE: 1
GASTROINTESTINAL NEGATIVE: 1
RESPIRATORY NEGATIVE: 1

## 2019-07-02 ASSESSMENT — PATIENT HEALTH QUESTIONNAIRE - PHQ9
1. LITTLE INTEREST OR PLEASURE IN DOING THINGS: 0
SUM OF ALL RESPONSES TO PHQ QUESTIONS 1-9: 1
SUM OF ALL RESPONSES TO PHQ9 QUESTIONS 1 & 2: 1
SUM OF ALL RESPONSES TO PHQ QUESTIONS 1-9: 1
2. FEELING DOWN, DEPRESSED OR HOPELESS: 1

## 2019-07-02 NOTE — PROGRESS NOTES
hemoglobin (Hb A1C)     DIABETES FOOT EXAM       Hypertension  Stable    Hyperlipidemia  On meds      Plan:     continue meds   refills    Labs    Return in 3 months          C Zohreh Brown MD

## 2019-07-31 RX ORDER — ATORVASTATIN CALCIUM 20 MG/1
20 TABLET, FILM COATED ORAL DAILY
Qty: 30 TABLET | Refills: 5 | Status: SHIPPED | OUTPATIENT
Start: 2019-07-31 | End: 2019-11-25 | Stop reason: SDUPTHER

## 2019-11-25 RX ORDER — AMLODIPINE BESYLATE 5 MG/1
5 TABLET ORAL DAILY
Qty: 30 TABLET | Refills: 5 | Status: SHIPPED | OUTPATIENT
Start: 2019-11-25 | End: 2020-03-28

## 2019-11-25 RX ORDER — SPIRONOLACTONE 25 MG/1
25 TABLET ORAL DAILY
Qty: 30 TABLET | Refills: 5 | Status: SHIPPED | OUTPATIENT
Start: 2019-11-25 | End: 2020-03-28

## 2019-11-25 RX ORDER — OLMESARTAN MEDOXOMIL AND HYDROCHLOROTHIAZIDE 40/25 40; 25 MG/1; MG/1
1 TABLET ORAL DAILY
Qty: 30 TABLET | Refills: 5 | Status: SHIPPED | OUTPATIENT
Start: 2019-11-25 | End: 2020-03-28

## 2019-11-25 RX ORDER — ATORVASTATIN CALCIUM 20 MG/1
20 TABLET, FILM COATED ORAL DAILY
Qty: 30 TABLET | Refills: 5 | Status: SHIPPED | OUTPATIENT
Start: 2019-11-25 | End: 2020-01-29

## 2020-01-06 ENCOUNTER — OFFICE VISIT (OUTPATIENT)
Dept: INTERNAL MEDICINE CLINIC | Age: 60
End: 2020-01-06
Payer: COMMERCIAL

## 2020-01-06 VITALS
BODY MASS INDEX: 34.67 KG/M2 | TEMPERATURE: 97 F | HEIGHT: 72 IN | WEIGHT: 256 LBS | SYSTOLIC BLOOD PRESSURE: 120 MMHG | DIASTOLIC BLOOD PRESSURE: 62 MMHG

## 2020-01-06 PROCEDURE — 99213 OFFICE O/P EST LOW 20 MIN: CPT | Performed by: STUDENT IN AN ORGANIZED HEALTH CARE EDUCATION/TRAINING PROGRAM

## 2020-01-06 ASSESSMENT — PATIENT HEALTH QUESTIONNAIRE - PHQ9
SUM OF ALL RESPONSES TO PHQ QUESTIONS 1-9: 0
2. FEELING DOWN, DEPRESSED OR HOPELESS: 0
1. LITTLE INTEREST OR PLEASURE IN DOING THINGS: 0
SUM OF ALL RESPONSES TO PHQ QUESTIONS 1-9: 0
SUM OF ALL RESPONSES TO PHQ9 QUESTIONS 1 & 2: 0

## 2020-01-06 ASSESSMENT — ENCOUNTER SYMPTOMS: SHORTNESS OF BREATH: 0

## 2020-01-06 NOTE — PROGRESS NOTES
Ashlee Guzman MD   olmesartan-hydrochlorothiazide (BENICAR HCT) 40-25 MG per tablet TAKE 1 TABLET BY MOUTH DAILY  Patient not taking: Reported on 1/6/2020  KAYLA Miramontes MD   sildenafil (VIAGRA) 100 MG tablet Take 1 tablet by mouth as needed for Erectile Dysfunction  Patient not taking: Reported on 1/6/2020  Usman Cain MD   tadalafil (CIALIS) 5 MG tablet Take 1 tablet by mouth as needed for Erectile Dysfunction  Patient not taking: Reported on 1/6/2020  KAYLA Miramontes MD   Testosterone (AXIRON) 30 MG/ACT SOLN Place 30 mg onto the skin daily. Patient not taking: Reported on 1/6/2020  Usman Cain MD   vardenafil (LEVITRA) 20 MG tablet Take 1 tablet by mouth as needed for Erectile Dysfunction. Patient not taking: Reported on 1/6/2020  KAYLA Miramontes MD        Social History     Tobacco Use    Smoking status: Never Smoker    Smokeless tobacco: Never Used   Substance Use Topics    Alcohol use: Yes        Vitals:    01/06/20 1102   BP: 120/62   Site: Right Upper Arm   Position: Sitting   Cuff Size: Large Adult   Temp: 97 °F (36.1 °C)   Weight: 256 lb (116.1 kg)   Height: 6' (1.829 m)     Estimated body mass index is 34.72 kg/m² as calculated from the following:    Height as of this encounter: 6' (1.829 m). Weight as of this encounter: 256 lb (116.1 kg). Physical Exam  Constitutional:       General: He is not in acute distress. Appearance: He is well-developed. He is not ill-appearing. HENT:      Head: Normocephalic and atraumatic. Eyes:      Pupils: Pupils are equal, round, and reactive to light. Neck:      Musculoskeletal: Normal range of motion and neck supple. Cardiovascular:      Rate and Rhythm: Normal rate and regular rhythm. Heart sounds: Normal heart sounds. No murmur. No friction rub. No gallop. Pulmonary:      Effort: Pulmonary effort is normal. No respiratory distress. Breath sounds: Normal breath sounds.    Abdominal:      General: Bowel sounds are normal.

## 2020-01-29 RX ORDER — ATORVASTATIN CALCIUM 20 MG/1
20 TABLET, FILM COATED ORAL DAILY
Qty: 30 TABLET | Refills: 5 | Status: SHIPPED | OUTPATIENT
Start: 2020-01-29 | End: 2020-07-21

## 2020-03-28 RX ORDER — AMLODIPINE BESYLATE 5 MG/1
5 TABLET ORAL DAILY
Qty: 90 TABLET | Refills: 5 | Status: SHIPPED | OUTPATIENT
Start: 2020-03-28 | End: 2021-06-21

## 2020-03-28 RX ORDER — SPIRONOLACTONE 25 MG/1
25 TABLET ORAL DAILY
Qty: 90 TABLET | Refills: 3 | Status: SHIPPED | OUTPATIENT
Start: 2020-03-28 | End: 2021-03-24

## 2020-03-28 RX ORDER — OLMESARTAN MEDOXOMIL AND HYDROCHLOROTHIAZIDE 40/25 40; 25 MG/1; MG/1
1 TABLET ORAL DAILY
Qty: 90 TABLET | Refills: 3 | Status: SHIPPED | OUTPATIENT
Start: 2020-03-28 | End: 2021-03-24

## 2020-07-10 ENCOUNTER — OFFICE VISIT (OUTPATIENT)
Dept: PRIMARY CARE CLINIC | Age: 60
End: 2020-07-10
Payer: COMMERCIAL

## 2020-07-10 PROCEDURE — 99211 OFF/OP EST MAY X REQ PHY/QHP: CPT | Performed by: NURSE PRACTITIONER

## 2020-07-10 NOTE — PROGRESS NOTES
Tobin Prabhakar received a viral test for COVID-19. They were educated on isolation and quarantine as appropriate. For any symptoms, they were directed to seek care from their PCP, given contact information to establish with a doctor, directed to an urgent care or the emergency room.

## 2020-07-15 LAB
SARS-COV-2: NOT DETECTED
SOURCE: NORMAL

## 2021-03-24 RX ORDER — OLMESARTAN MEDOXOMIL AND HYDROCHLOROTHIAZIDE 40/25 40; 25 MG/1; MG/1
1 TABLET ORAL DAILY
Qty: 90 TABLET | Refills: 3 | Status: SHIPPED | OUTPATIENT
Start: 2021-03-24 | End: 2021-11-19 | Stop reason: SDUPTHER

## 2021-03-24 RX ORDER — SPIRONOLACTONE 25 MG/1
25 TABLET ORAL DAILY
Qty: 90 TABLET | Refills: 3 | Status: SHIPPED | OUTPATIENT
Start: 2021-03-24 | End: 2021-11-19 | Stop reason: SDUPTHER

## 2021-05-07 ENCOUNTER — OFFICE VISIT (OUTPATIENT)
Dept: INTERNAL MEDICINE CLINIC | Age: 61
End: 2021-05-07
Payer: COMMERCIAL

## 2021-05-07 VITALS
HEART RATE: 85 BPM | BODY MASS INDEX: 35.49 KG/M2 | TEMPERATURE: 96.9 F | SYSTOLIC BLOOD PRESSURE: 129 MMHG | HEIGHT: 72 IN | OXYGEN SATURATION: 97 % | DIASTOLIC BLOOD PRESSURE: 71 MMHG | WEIGHT: 262 LBS | RESPIRATION RATE: 16 BRPM

## 2021-05-07 DIAGNOSIS — I10 ESSENTIAL HYPERTENSION: Primary | ICD-10-CM

## 2021-05-07 DIAGNOSIS — I10 ESSENTIAL HYPERTENSION: ICD-10-CM

## 2021-05-07 DIAGNOSIS — Z83.3 FAMILY HISTORY OF DIABETES MELLITUS: ICD-10-CM

## 2021-05-07 DIAGNOSIS — E78.5 HYPERLIPIDEMIA, UNSPECIFIED HYPERLIPIDEMIA TYPE: ICD-10-CM

## 2021-05-07 DIAGNOSIS — R30.0 DYSURIA: ICD-10-CM

## 2021-05-07 LAB
ANION GAP SERPL CALCULATED.3IONS-SCNC: 15 MMOL/L (ref 3–16)
BASOPHILS ABSOLUTE: 0 K/UL (ref 0–0.2)
BASOPHILS RELATIVE PERCENT: 0.4 %
BILIRUBIN URINE: NEGATIVE
BLOOD, URINE: NEGATIVE
BUN BLDV-MCNC: 18 MG/DL (ref 7–20)
CALCIUM SERPL-MCNC: 10 MG/DL (ref 8.3–10.6)
CHLORIDE BLD-SCNC: 98 MMOL/L (ref 99–110)
CLARITY: CLEAR
CO2: 29 MMOL/L (ref 21–32)
COLOR: YELLOW
CREAT SERPL-MCNC: 1 MG/DL (ref 0.8–1.3)
EOSINOPHILS ABSOLUTE: 0.4 K/UL (ref 0–0.6)
EOSINOPHILS RELATIVE PERCENT: 4.3 %
GFR AFRICAN AMERICAN: >60
GFR NON-AFRICAN AMERICAN: >60
GLUCOSE BLD-MCNC: 100 MG/DL (ref 70–99)
GLUCOSE URINE: NEGATIVE MG/DL
HBA1C MFR BLD: 6.1 %
HCT VFR BLD CALC: 43.5 % (ref 40.5–52.5)
HEMOGLOBIN: 14.3 G/DL (ref 13.5–17.5)
KETONES, URINE: NEGATIVE MG/DL
LEUKOCYTE ESTERASE, URINE: NEGATIVE
LYMPHOCYTES ABSOLUTE: 2.4 K/UL (ref 1–5.1)
LYMPHOCYTES RELATIVE PERCENT: 26.9 %
MCH RBC QN AUTO: 28.8 PG (ref 26–34)
MCHC RBC AUTO-ENTMCNC: 32.9 G/DL (ref 31–36)
MCV RBC AUTO: 87.5 FL (ref 80–100)
MICROSCOPIC EXAMINATION: NORMAL
MONOCYTES ABSOLUTE: 0.7 K/UL (ref 0–1.3)
MONOCYTES RELATIVE PERCENT: 7.3 %
NEUTROPHILS ABSOLUTE: 5.5 K/UL (ref 1.7–7.7)
NEUTROPHILS RELATIVE PERCENT: 61.1 %
NITRITE, URINE: NEGATIVE
PDW BLD-RTO: 13.8 % (ref 12.4–15.4)
PH UA: 6.5 (ref 5–8)
PLATELET # BLD: 353 K/UL (ref 135–450)
PMV BLD AUTO: 8.2 FL (ref 5–10.5)
POTASSIUM SERPL-SCNC: 4.3 MMOL/L (ref 3.5–5.1)
PROTEIN UA: NEGATIVE MG/DL
RBC # BLD: 4.98 M/UL (ref 4.2–5.9)
SODIUM BLD-SCNC: 142 MMOL/L (ref 136–145)
SPECIFIC GRAVITY UA: 1.02 (ref 1–1.03)
URINE TYPE: NORMAL
UROBILINOGEN, URINE: 0.2 E.U./DL
WBC # BLD: 9 K/UL (ref 4–11)

## 2021-05-07 PROCEDURE — 99213 OFFICE O/P EST LOW 20 MIN: CPT | Performed by: STUDENT IN AN ORGANIZED HEALTH CARE EDUCATION/TRAINING PROGRAM

## 2021-05-07 PROCEDURE — 83036 HEMOGLOBIN GLYCOSYLATED A1C: CPT

## 2021-05-07 ASSESSMENT — ENCOUNTER SYMPTOMS
BACK PAIN: 0
EYE ITCHING: 0
FACIAL SWELLING: 0
EYE DISCHARGE: 0
COUGH: 0
CHOKING: 0
SHORTNESS OF BREATH: 0
WHEEZING: 0

## 2021-05-07 ASSESSMENT — PATIENT HEALTH QUESTIONNAIRE - PHQ9
SUM OF ALL RESPONSES TO PHQ QUESTIONS 1-9: 0
1. LITTLE INTEREST OR PLEASURE IN DOING THINGS: 0
2. FEELING DOWN, DEPRESSED OR HOPELESS: 0
SUM OF ALL RESPONSES TO PHQ QUESTIONS 1-9: 0
SUM OF ALL RESPONSES TO PHQ QUESTIONS 1-9: 0

## 2021-05-07 NOTE — PATIENT INSTRUCTIONS
Please continue your current medications  Please continued to exercise on a regular. Please continue to increase vegetables in your diet and limit high fatty foods.   Please get your blood work done along with a urine sample  Please let your urologist know to update the records so we can review it at the outpatient clinic  Please return to clinic and 6 months

## 2021-05-07 NOTE — PROGRESS NOTES
Outpatient Clinic Established Patient Note    Patient: Roro Castanon  : 1960 (61 y.o.)  Date: 2021    CC: Routine follow-up    HPI:      Patient is a 70-year-old male with past medical history of hypertension, LOLIS, HLD, prediabetes, prostate CA s/p CyberKnife who presented for routine follow-up as he has not been seen since 2019. Patient endorses he has been fine and updated me on the fact that he had prostate cancer with CyberKnife therapy. Patient's records do not reflect that. Patient endorses he follows with the urology group and will make sure they update records for it to be seen in the outpatient clinic. Overall, patient has no complaints at this time and feels well. Home Meds:  Prior to Visit Medications    Medication Sig Taking?  Authorizing Provider   olmesartan-hydroCHLOROthiazide (BENICAR HCT) 40-25 MG per tablet TAKE 1 TABLET BY MOUTH DAILY  KAYLA Chowdhury MD   spironolactone (ALDACTONE) 25 MG tablet TAKE 1 TABLET BY MOUTH DAILY  KAYLA Chowdhury MD   atorvastatin (LIPITOR) 20 MG tablet TAKE 1 TABLET BY MOUTH DAILY  KAYLA Chowdhury MD   amLODIPine (NORVASC) 5 MG tablet TAKE 1 TABLET BY MOUTH DAILY  KAYLA Chowdhury MD   olmesartan-hydrochlorothiazide (BENICAR HCT) 40-25 MG per tablet TAKE 1 TABLET BY MOUTH DAILY  KAYLA Chowdhury MD   amLODIPine (NORVASC) 5 MG tablet TAKE 1 TABLET BY MOUTH DAILY  Patient not taking: Reported on 2020  KAYLA Chowdhury MD   spironolactone (ALDACTONE) 25 MG tablet TAKE 1 TABLET BY MOUTH DAILY  Patient not taking: Reported on 2020  KAYLA Cohwdhury MD   olmesartan-hydrochlorothiazide (BENICAR HCT) 40-25 MG per tablet TAKE 1 TABLET BY MOUTH DAILY  Patient not taking: Reported on 2020  KAYLA Chowdhury MD   sildenafil (VIAGRA) 100 MG tablet Take 1 tablet by mouth as needed for Erectile Dysfunction  Patient not taking: Reported on 2020  KAYLA Chowdhury MD   tadalafil (CIALIS) 5 MG tablet Take 1 tablet by mouth as needed for Erectile discharge. Left eye: No discharge. Extraocular Movements: Extraocular movements intact. Pupils: Pupils are equal, round, and reactive to light. Cardiovascular:      Rate and Rhythm: Normal rate and regular rhythm. Pulmonary:      Effort: Pulmonary effort is normal. No respiratory distress. Breath sounds: Normal breath sounds. No wheezing or rales. Abdominal:      General: There is no distension. Palpations: Abdomen is soft. Tenderness: There is no abdominal tenderness. There is no guarding. Musculoskeletal:      Right lower leg: No edema. Left lower leg: No edema. Neurological:      General: No focal deficit present. Mental Status: He is alert and oriented to person, place, and time. Psychiatric:         Behavior: Behavior normal.       Assessment & Plan:      1. Hypertension  Patient's blood pressure at the clinic today was 129/71  Patient endorses that he exercises routinely by biking. Patient takes amlodipine, spironolactone, olmesartan-hydrochlorothiazide  -BMP pending    2. Hyperlipidemia  Patient takes atorvastatin    3. Prediabetes  Last hemoglobin A1c 7/2019 = 6.1%  Hemoglobin A1c today = 6.1%  Patient encouraged to continue to watch his diet increase vegetables and decrease fatty foods. 4. Obstructive sleep apnea   Patient endorses adhering to CPAP daily    5. Prostate CA   Patient is status post CyberKnife. Patient follows with the urology group. No records in care everywhere for his current treatment of his prostate cancer. Patient endorses he will talk to the urology group to have records updated  Last PSA December 2020 = 1.91  Patient's next PSA check will be June of this year     6. Dysuria  Patient endorses he was told by urology that this could be a symptom after his CyberKnife therapy.   Per patient, this is not constant  -Urinalysis pending     Dispo: Pt will be staffed with Dr. Fu Spar  _______________  Sai Kiser MD, 5/7/2021 10:42 AM   PGY-1

## 2021-06-21 RX ORDER — AMLODIPINE BESYLATE 5 MG/1
5 TABLET ORAL DAILY
Qty: 90 TABLET | Refills: 1 | Status: SHIPPED | OUTPATIENT
Start: 2021-06-21 | End: 2021-11-19 | Stop reason: SDUPTHER

## 2021-06-21 NOTE — TELEPHONE ENCOUNTER
Last ov - 05/07/2021 - Dr. Ana Laura Eller  Next ov - 11/12/2021 -     Amlodipine last filled  03/28/2020 - 5 rf (90 day supply)

## 2021-07-30 RX ORDER — ATORVASTATIN CALCIUM 20 MG/1
20 TABLET, FILM COATED ORAL DAILY
Qty: 90 TABLET | Refills: 1 | Status: SHIPPED | OUTPATIENT
Start: 2021-07-30 | End: 2022-01-11

## 2021-07-30 NOTE — TELEPHONE ENCOUNTER
Last ov - 05/07/2021 - Daniele  Next ov - 11/12/2021    Atorvastatin last filled  07/21/2020 - 3 rf  ( 90 day supplys )

## 2021-11-19 ENCOUNTER — OFFICE VISIT (OUTPATIENT)
Dept: INTERNAL MEDICINE CLINIC | Age: 61
End: 2021-11-19
Payer: COMMERCIAL

## 2021-11-19 VITALS
BODY MASS INDEX: 34.98 KG/M2 | RESPIRATION RATE: 16 BRPM | HEART RATE: 87 BPM | TEMPERATURE: 98.2 F | HEIGHT: 72 IN | SYSTOLIC BLOOD PRESSURE: 138 MMHG | WEIGHT: 258.3 LBS | DIASTOLIC BLOOD PRESSURE: 89 MMHG | OXYGEN SATURATION: 95 %

## 2021-11-19 DIAGNOSIS — E78.5 HYPERLIPIDEMIA, UNSPECIFIED HYPERLIPIDEMIA TYPE: ICD-10-CM

## 2021-11-19 DIAGNOSIS — G47.33 OBSTRUCTIVE SLEEP APNEA SYNDROME: ICD-10-CM

## 2021-11-19 DIAGNOSIS — C61 PROSTATE CANCER (HCC): ICD-10-CM

## 2021-11-19 DIAGNOSIS — R73.03 PREDIABETES: ICD-10-CM

## 2021-11-19 DIAGNOSIS — I10 ESSENTIAL HYPERTENSION: Primary | ICD-10-CM

## 2021-11-19 PROCEDURE — 99213 OFFICE O/P EST LOW 20 MIN: CPT | Performed by: STUDENT IN AN ORGANIZED HEALTH CARE EDUCATION/TRAINING PROGRAM

## 2021-11-19 ASSESSMENT — ENCOUNTER SYMPTOMS
NAUSEA: 0
VOMITING: 0
CHOKING: 0
PHOTOPHOBIA: 0
SHORTNESS OF BREATH: 0
RHINORRHEA: 0
COUGH: 0
ABDOMINAL DISTENTION: 0
WHEEZING: 0
APNEA: 0
DIARRHEA: 0

## 2021-11-19 NOTE — PROGRESS NOTES
Outpatient Clinic Established Patient Note    Patient: Tosha Solis  : 1960 (66 y.o.)  Date: 2021    CC: blood pressure follow up    HPI:      Patient doing well. No major complaints. Works in insurance. Continues with diet and exercise. BP good at home. No chest pain, SOB, fevers, palpitations. Compliant with CPAP. Home Meds:  Prior to Visit Medications    Medication Sig Taking? Authorizing Provider   atorvastatin (LIPITOR) 20 MG tablet Take 1 tablet by mouth daily Yes Meg Hall DO   amLODIPine (NORVASC) 5 MG tablet TAKE 1 TABLET BY MOUTH DAILY Yes KAYLA Farooq MD   spironolactone (ALDACTONE) 25 MG tablet TAKE 1 TABLET BY MOUTH DAILY Yes KAYLA Farooq MD   sildenafil (VIAGRA) 100 MG tablet Take 1 tablet by mouth as needed for Erectile Dysfunction Yes KAYLA Farooq MD   olmesartan-amLODIPine-HCTZ (TRIBENZOR) 40-5-25 MG TABS Take 40 mg by mouth daily. Yes Carmel Ojeda MD       Allergies:    Patient has no known allergies. Health Maintenance Due   Topic Date Due    COVID-19 Vaccine (1) Never done    HIV screen  Never done    DTaP/Tdap/Td vaccine (1 - Tdap) Never done    Colon cancer screen colonoscopy  Never done    Shingles Vaccine (1 of 2) Never done    Lipid screen  2018    PSA counseling  2020    Flu vaccine (1) 2021       Immunization History   Administered Date(s) Administered    Influenza Virus Vaccine 10/31/2017       Review of Systems   Constitutional: Negative for activity change, diaphoresis, fatigue and unexpected weight change. HENT: Negative for congestion, rhinorrhea and tinnitus. Eyes: Negative for photophobia and visual disturbance. Respiratory: Negative for apnea, cough, choking, shortness of breath and wheezing. Cardiovascular: Negative for chest pain and leg swelling. Gastrointestinal: Negative for abdominal distention, diarrhea, nausea and vomiting.    Genitourinary: Negative for difficulty urinating and frequency. Musculoskeletal: Negative for arthralgias and myalgias. Neurological: Negative for dizziness, syncope and light-headedness. Psychiatric/Behavioral: Negative for agitation and hallucinations. A 10-organ Review Of Systems was obtained and otherwise unremarkable except as per HPI. Data: Old records have been reviewed electronically. PHYSICAL EXAM:  /89 (Site: Left Upper Arm, Position: Sitting, Cuff Size: Large Adult)   Pulse 87   Temp 98.2 °F (36.8 °C) (Temporal)   Resp 16   Ht 6' (1.829 m)   Wt 258 lb 4.8 oz (117.2 kg)   SpO2 95%   BMI 35.03 kg/m²   Physical Exam  Constitutional:       General: He is not in acute distress. Appearance: He is well-developed. He is not diaphoretic. HENT:      Head: Normocephalic and atraumatic. Mouth/Throat:      Pharynx: No oropharyngeal exudate. Eyes:      General:         Right eye: No discharge. Left eye: No discharge. Conjunctiva/sclera: Conjunctivae normal.      Pupils: Pupils are equal, round, and reactive to light. Neck:      Thyroid: No thyromegaly. Vascular: No JVD. Trachea: No tracheal deviation. Cardiovascular:      Rate and Rhythm: Normal rate and regular rhythm. Heart sounds: No murmur heard. No friction rub. No gallop. Pulmonary:      Effort: Pulmonary effort is normal. No respiratory distress. Breath sounds: No stridor. No wheezing or rales. Abdominal:      General: Bowel sounds are normal. There is no distension. Palpations: Abdomen is soft. There is no mass. Tenderness: There is no abdominal tenderness. There is no guarding. Musculoskeletal:         General: No tenderness or deformity. Normal range of motion. Cervical back: Normal range of motion and neck supple. Lymphadenopathy:      Cervical: No cervical adenopathy. Skin:     General: Skin is warm and dry. Coloration: Skin is not pale. Findings: No erythema or rash.    Neurological:      Mental Status: He is alert and oriented to person, place, and time. Cranial Nerves: No cranial nerve deficit. Sensory: No sensory deficit. Motor: No abnormal muscle tone. Coordination: Coordination normal.      Deep Tendon Reflexes: Reflexes normal.   Psychiatric:         Behavior: Behavior normal.         Thought Content: Thought content normal.         Assessment & Plan:      1. Essential hypertension  - controlled, continue current meds    2. Hyperlipidemia, unspecified hyperlipidemia type  - LIPID PANEL; Future     3. Prediabetes  - last A1C 6.1  - continue with life-style management    4. Prostate cancer [facility])  - follows with urology, s/p cyberknife 2020    5. LOLIS  - compliant with CPAP per patient    Return in about 6 months (around 5/19/2022).     Dispo: Pt has been staffed with Dr. Alexandro Manzanares  _______________  Radu Leroy MD, 11/19/2021 3:25 PM   PGY-3

## 2022-01-11 RX ORDER — SPIRONOLACTONE 25 MG/1
25 TABLET ORAL DAILY
Qty: 30 TABLET | Refills: 5 | Status: SHIPPED | OUTPATIENT
Start: 2022-01-11 | End: 2022-07-12

## 2022-01-11 RX ORDER — ATORVASTATIN CALCIUM 20 MG/1
20 TABLET, FILM COATED ORAL DAILY
Qty: 90 TABLET | Refills: 3 | Status: SHIPPED | OUTPATIENT
Start: 2022-01-11

## 2022-01-11 RX ORDER — AMLODIPINE BESYLATE 5 MG/1
5 TABLET ORAL DAILY
Qty: 30 TABLET | Refills: 5 | Status: SHIPPED | OUTPATIENT
Start: 2022-01-11 | End: 2022-07-12

## 2022-01-14 RX ORDER — OLMESARTAN MEDOXOMIL, AMLODIPINE AND HYDROCHLOROTHIAZIDE TABLET 40/5/25 MG 40; 5; 25 MG/1; MG/1; MG/1
40 TABLET ORAL DAILY
Qty: 30 TABLET | Refills: 5 | Status: SHIPPED | OUTPATIENT
Start: 2022-01-14 | End: 2022-03-01 | Stop reason: SINTOL

## 2022-03-01 RX ORDER — OLMESARTAN MEDOXOMIL AND HYDROCHLOROTHIAZIDE 40/25 40; 25 MG/1; MG/1
1 TABLET ORAL DAILY
Qty: 90 TABLET | Refills: 3 | Status: SHIPPED | OUTPATIENT
Start: 2022-03-01 | End: 2022-06-06 | Stop reason: SDUPTHER

## 2022-05-18 ENCOUNTER — OFFICE VISIT (OUTPATIENT)
Dept: INTERNAL MEDICINE CLINIC | Age: 62
End: 2022-05-18
Payer: COMMERCIAL

## 2022-05-18 VITALS
WEIGHT: 270.5 LBS | HEART RATE: 85 BPM | SYSTOLIC BLOOD PRESSURE: 124 MMHG | DIASTOLIC BLOOD PRESSURE: 86 MMHG | HEIGHT: 72 IN | OXYGEN SATURATION: 96 % | TEMPERATURE: 97.1 F | BODY MASS INDEX: 36.64 KG/M2

## 2022-05-18 DIAGNOSIS — E66.01 MORBID OBESITY (HCC): Primary | ICD-10-CM

## 2022-05-18 DIAGNOSIS — Z12.11 SCREEN FOR COLON CANCER: ICD-10-CM

## 2022-05-18 DIAGNOSIS — I10 ESSENTIAL HYPERTENSION: ICD-10-CM

## 2022-05-18 DIAGNOSIS — C61 PROSTATE CANCER (HCC): ICD-10-CM

## 2022-05-18 DIAGNOSIS — E78.5 HYPERLIPIDEMIA, UNSPECIFIED HYPERLIPIDEMIA TYPE: ICD-10-CM

## 2022-05-18 DIAGNOSIS — R73.03 PREDIABETES: ICD-10-CM

## 2022-05-18 PROCEDURE — 99213 OFFICE O/P EST LOW 20 MIN: CPT | Performed by: STUDENT IN AN ORGANIZED HEALTH CARE EDUCATION/TRAINING PROGRAM

## 2022-05-18 ASSESSMENT — PATIENT HEALTH QUESTIONNAIRE - PHQ9
2. FEELING DOWN, DEPRESSED OR HOPELESS: 0
SUM OF ALL RESPONSES TO PHQ QUESTIONS 1-9: 0
1. LITTLE INTEREST OR PLEASURE IN DOING THINGS: 0
SUM OF ALL RESPONSES TO PHQ QUESTIONS 1-9: 0
SUM OF ALL RESPONSES TO PHQ QUESTIONS 1-9: 0
SUM OF ALL RESPONSES TO PHQ9 QUESTIONS 1 & 2: 0
SUM OF ALL RESPONSES TO PHQ QUESTIONS 1-9: 0

## 2022-05-18 NOTE — PROGRESS NOTES
Outpatient Clinic Established Patient Note    Patient: Loida Mendes  : 1960 (33 y.o.)  Date: 2022    CC: Follow up    HPI:      64 M PMH prostate cancer s/p cyberknife (2019), HTN, morbid obesity who presents for follow up. Morbid obesity - patient has gained 12 pounds since 2021. He reports he has been traveling a lot for sports  Is involved in. He reports he has been eating and healthy. He started biking about 3 times a week for 30 minutes a day. He is also interested in weight loss program.  Hypertension- no acute complaints. Denies headaches, chest pain, shortness of breath, palpitations, abdominal pain, nausea vomiting, lower extremity swelling. Prostate cancer status post CyberKnife-follows with urologist, has an appointment next week. No acute complaints at this time. Home Meds:  Prior to Visit Medications    Medication Sig Taking? Authorizing Provider   olmesartan-hydroCHLOROthiazide (BENICAR HCT) 40-25 MG per tablet Take 1 tablet by mouth daily Yes Augusta Langford MD   amLODIPine (NORVASC) 5 MG tablet Take 1 tablet by mouth daily Yes Maria Luisa Rosario DO   spironolactone (ALDACTONE) 25 MG tablet Take 1 tablet by mouth daily Yes Maria Luisa Rosario,    atorvastatin (LIPITOR) 20 MG tablet TAKE 1 TABLET BY MOUTH DAILY Yes Marie Chowdhury MD   sildenafil (VIAGRA) 100 MG tablet Take 1 tablet by mouth as needed for Erectile Dysfunction Yes KAYLA Juárez MD       Allergies:    Patient has no known allergies.     Health Maintenance Due   Topic Date Due    COVID-19 Vaccine (1) Never done    HIV screen  Never done    DTaP/Tdap/Td vaccine (1 - Tdap) Never done    Colorectal Cancer Screen  Never done    Shingles vaccine (1 of 2) Never done    Lipids  2018    A1C test (Diabetic or Prediabetic)  2022    Depression Screen  2022       Immunization History   Administered Date(s) Administered    Influenza Virus Vaccine 10/31/2017       Review of Systems  A 10-organ Review Of Systems was obtained and otherwise unremarkable except as per HPI. Data: Old records have been reviewed electronically. PHYSICAL EXAM:  /86 (Site: Left Upper Arm, Position: Sitting, Cuff Size: Large Adult)   Pulse 85   Temp 97.1 °F (36.2 °C) (Temporal)   Ht 6' (1.829 m)   Wt 270 lb 8 oz (122.7 kg)   SpO2 96%   BMI 36.69 kg/m²   Physical Exam  Constitutional:       General: He is not in acute distress. Appearance: He is obese. He is not ill-appearing. Cardiovascular:      Rate and Rhythm: Normal rate and regular rhythm. Heart sounds: No murmur heard. No friction rub. No gallop. Pulmonary:      Effort: Pulmonary effort is normal. No respiratory distress. Breath sounds: Normal breath sounds. No wheezing, rhonchi or rales. Musculoskeletal:      Right lower leg: No edema. Left lower leg: No edema. Assessment & Plan:      1. Morbid obesity ([facility] [address]  - [facility], [place]    2. Screen for colon cancer  - AFL - Cindi Collazo MD, Gastroenterology, [facility]    3. [facility] ([facility]  - PSA Screening; Future    4. Essential hypertension  - CBC with Auto Differential; Future  - Comprehensive Metabolic Panel, Fasting; Future    5. Hyperlipidemia, unspecified hyperlipidemia type  - LIPID PANEL; Future    6. Prediabetes  - Hemoglobin A1C; Future      Return in about 6 months (around 11/18/2022).     Dispo: Pt has been staffed with Dr. Bunny Garnett  _______________  Orly Valdivia MD, 5/18/2022 2:03 PM   PGY-3

## 2022-05-27 LAB — PSA, TOTAL: 1.02 NG/ML (ref 0–4)

## 2022-06-06 ENCOUNTER — OFFICE VISIT (OUTPATIENT)
Dept: INTERNAL MEDICINE CLINIC | Age: 62
End: 2022-06-06
Payer: COMMERCIAL

## 2022-06-06 VITALS
RESPIRATION RATE: 20 BRPM | HEART RATE: 85 BPM | WEIGHT: 266.5 LBS | SYSTOLIC BLOOD PRESSURE: 143 MMHG | OXYGEN SATURATION: 98 % | DIASTOLIC BLOOD PRESSURE: 84 MMHG | HEIGHT: 72 IN | BODY MASS INDEX: 36.1 KG/M2 | TEMPERATURE: 97.2 F

## 2022-06-06 DIAGNOSIS — B02.23 POST-HERPETIC POLYNEUROPATHY: ICD-10-CM

## 2022-06-06 DIAGNOSIS — B02.23 POST-HERPETIC POLYNEUROPATHY: Primary | ICD-10-CM

## 2022-06-06 DIAGNOSIS — I10 PRIMARY HYPERTENSION: ICD-10-CM

## 2022-06-06 DIAGNOSIS — C61 PROSTATE CANCER (HCC): ICD-10-CM

## 2022-06-06 PROCEDURE — 99213 OFFICE O/P EST LOW 20 MIN: CPT | Performed by: STUDENT IN AN ORGANIZED HEALTH CARE EDUCATION/TRAINING PROGRAM

## 2022-06-06 RX ORDER — OLMESARTAN MEDOXOMIL AND HYDROCHLOROTHIAZIDE 40/25 40; 25 MG/1; MG/1
1 TABLET ORAL DAILY
Qty: 90 TABLET | Refills: 3 | Status: SHIPPED | OUTPATIENT
Start: 2022-06-06 | End: 2022-09-08 | Stop reason: SDUPTHER

## 2022-06-06 RX ORDER — LIDOCAINE 50 MG/G
1 PATCH TOPICAL DAILY
Qty: 30 PATCH | Refills: 0 | Status: SHIPPED | OUTPATIENT
Start: 2022-06-06 | End: 2022-07-06

## 2022-06-06 RX ORDER — LIDOCAINE 50 MG/G
1 PATCH TOPICAL DAILY
Qty: 90 PATCH | OUTPATIENT
Start: 2022-06-06 | End: 2022-07-06

## 2022-06-06 RX ORDER — IBUPROFEN 200 MG
600 TABLET ORAL EVERY 8 HOURS PRN
COMMUNITY
End: 2022-09-12 | Stop reason: ALTCHOICE

## 2022-06-06 RX ORDER — GABAPENTIN 300 MG/1
300 CAPSULE ORAL NIGHTLY
Qty: 90 CAPSULE | Refills: 1 | Status: SHIPPED | OUTPATIENT
Start: 2022-06-06 | End: 2022-09-12 | Stop reason: ALTCHOICE

## 2022-06-06 ASSESSMENT — ENCOUNTER SYMPTOMS
ABDOMINAL DISTENTION: 0
SORE THROAT: 0
NAUSEA: 0
DIARRHEA: 0
ABDOMINAL PAIN: 0
CONSTIPATION: 0
SHORTNESS OF BREATH: 0

## 2022-06-06 NOTE — PROGRESS NOTES
Screen  Never done    Shingles vaccine (1 of 2) Never done    Lipids  09/26/2018    A1C test (Diabetic or Prediabetic)  05/07/2022       Immunization History   Administered Date(s) Administered    Influenza Virus Vaccine 10/31/2017       Review of Systems   Constitutional: Negative for fatigue, fever and unexpected weight change. HENT: Negative for sore throat. Respiratory: Negative for shortness of breath. Gastrointestinal: Negative for abdominal distention, abdominal pain, constipation, diarrhea and nausea. Endocrine: Negative for polydipsia, polyphagia and polyuria. Genitourinary: Negative for frequency. Neurological: Negative for dizziness and light-headedness. Psychiatric/Behavioral: Negative for agitation. A 10-organ Review Of Systems was obtained and otherwise unremarkable except as per HPI. Data: Old records have been reviewed electronically. PHYSICAL EXAM:  There were no vitals taken for this visit. Physical Exam  HENT:      Head: Normocephalic and atraumatic. Nose: Nose normal.      Mouth/Throat:      Mouth: Mucous membranes are moist.   Eyes:      Extraocular Movements: Extraocular movements intact. Pupils: Pupils are equal, round, and reactive to light. Cardiovascular:      Rate and Rhythm: Normal rate and regular rhythm. Pulses: Normal pulses. Heart sounds: Normal heart sounds. No murmur heard. No friction rub. No gallop. Pulmonary:      Effort: Pulmonary effort is normal. No respiratory distress. Breath sounds: Normal breath sounds. No wheezing or rales. Abdominal:      General: There is no distension. Palpations: Abdomen is soft. Tenderness: There is no abdominal tenderness. There is no guarding. Comments: Shingles lesion in the dermatomal region has crusted over   Musculoskeletal:      Right lower leg: No edema. Left lower leg: No edema.    Neurological:      Mental Status: He is alert and oriented to person, place, and time. Psychiatric:         Behavior: Behavior normal.       Assessment & Plan:      1. Shingles  Patient endorses last week he had a spot on his abdomen he felt it was a spider bite. He was able to make it to his son's graduation in [address] [place] [address]. While at that visit, he went to the ER as the pain was worse with multiple lesions and he was therefore diagnosed with shingles. He was given Valtrex and Motrin. He has finished both these courses.  -Patient continues to have pain in the dermatomal region even though his lesions have crusted over  -We will start gabapentin 300 nightly as well as lidocaine patch every 12 hours    2. Hypertension  Patient's blood pressure at the clinic today was 143/84  Patient endorses that he exercises routinely by biking. Patient takes amlodipine, spironolactone, olmesartan-hydrochlorothiazide     2. Hyperlipidemia  Patient takes atorvastatin 20 mg daily     3. Prediabetes  Last hemoglobin A1c 5/2021 = 6.1%  Patient encouraged to continue to watch his diet increase vegetables and decrease fatty foods.  -We will recheck an A1c at next visit     4. Obstructive sleep apnea   Patient endorses adhering to CPAP daily     5. Prostate CA   Patient is status post CyberKnife. Patient follows with the urology group. Last PSA May 2022 = 1.02     Dispo: Pt has been staffed with Dr. Samuel Gomez  _______________  Michelle Winston MD, 6/6/2022 2:21 PM   PGY-2    This note was likely completed using voice recognition technology and may contain unintended errors.

## 2022-06-10 DIAGNOSIS — I10 ESSENTIAL HYPERTENSION: ICD-10-CM

## 2022-06-10 DIAGNOSIS — R73.03 PREDIABETES: ICD-10-CM

## 2022-06-10 DIAGNOSIS — E78.5 HYPERLIPIDEMIA, UNSPECIFIED HYPERLIPIDEMIA TYPE: ICD-10-CM

## 2022-06-10 DIAGNOSIS — C61 PROSTATE CANCER (HCC): ICD-10-CM

## 2022-06-10 LAB
A/G RATIO: 1.5 (ref 1.1–2.2)
ALBUMIN SERPL-MCNC: 4.3 G/DL (ref 3.4–5)
ALP BLD-CCNC: 89 U/L (ref 40–129)
ALT SERPL-CCNC: 38 U/L (ref 10–40)
ANION GAP SERPL CALCULATED.3IONS-SCNC: 15 MMOL/L (ref 3–16)
AST SERPL-CCNC: 28 U/L (ref 15–37)
BASOPHILS ABSOLUTE: 0 K/UL (ref 0–0.2)
BASOPHILS RELATIVE PERCENT: 0.3 %
BILIRUB SERPL-MCNC: 0.3 MG/DL (ref 0–1)
BUN BLDV-MCNC: 16 MG/DL (ref 7–20)
CALCIUM SERPL-MCNC: 10.4 MG/DL (ref 8.3–10.6)
CHLORIDE BLD-SCNC: 106 MMOL/L (ref 99–110)
CHOLESTEROL, TOTAL: 133 MG/DL (ref 0–199)
CO2: 25 MMOL/L (ref 21–32)
CREAT SERPL-MCNC: 1.2 MG/DL (ref 0.8–1.3)
EOSINOPHILS ABSOLUTE: 0.4 K/UL (ref 0–0.6)
EOSINOPHILS RELATIVE PERCENT: 4.9 %
GFR AFRICAN AMERICAN: >60
GFR NON-AFRICAN AMERICAN: >60
GLUCOSE FASTING: 102 MG/DL (ref 70–99)
HCT VFR BLD CALC: 40.4 % (ref 40.5–52.5)
HDLC SERPL-MCNC: 33 MG/DL (ref 40–60)
HEMOGLOBIN: 13.6 G/DL (ref 13.5–17.5)
LDL CHOLESTEROL CALCULATED: 73 MG/DL
LYMPHOCYTES ABSOLUTE: 2.3 K/UL (ref 1–5.1)
LYMPHOCYTES RELATIVE PERCENT: 25.9 %
MCH RBC QN AUTO: 29.3 PG (ref 26–34)
MCHC RBC AUTO-ENTMCNC: 33.6 G/DL (ref 31–36)
MCV RBC AUTO: 87.4 FL (ref 80–100)
MONOCYTES ABSOLUTE: 0.6 K/UL (ref 0–1.3)
MONOCYTES RELATIVE PERCENT: 6.7 %
NEUTROPHILS ABSOLUTE: 5.6 K/UL (ref 1.7–7.7)
NEUTROPHILS RELATIVE PERCENT: 62.2 %
PDW BLD-RTO: 14 % (ref 12.4–15.4)
PLATELET # BLD: 337 K/UL (ref 135–450)
PMV BLD AUTO: 8.4 FL (ref 5–10.5)
POTASSIUM SERPL-SCNC: 4.1 MMOL/L (ref 3.5–5.1)
PROSTATE SPECIFIC ANTIGEN: 1 NG/ML (ref 0–4)
RBC # BLD: 4.63 M/UL (ref 4.2–5.9)
SODIUM BLD-SCNC: 146 MMOL/L (ref 136–145)
TOTAL PROTEIN: 7.2 G/DL (ref 6.4–8.2)
TRIGL SERPL-MCNC: 137 MG/DL (ref 0–150)
VLDLC SERPL CALC-MCNC: 27 MG/DL
WBC # BLD: 9 K/UL (ref 4–11)

## 2022-06-11 LAB
ESTIMATED AVERAGE GLUCOSE: 137 MG/DL
HBA1C MFR BLD: 6.4 %

## 2022-07-12 RX ORDER — SPIRONOLACTONE 25 MG/1
25 TABLET ORAL DAILY
Qty: 30 TABLET | Refills: 5 | Status: SHIPPED | OUTPATIENT
Start: 2022-07-12

## 2022-07-12 RX ORDER — AMLODIPINE BESYLATE 5 MG/1
5 TABLET ORAL DAILY
Qty: 30 TABLET | Refills: 5 | Status: SHIPPED | OUTPATIENT
Start: 2022-07-12

## 2022-09-08 DIAGNOSIS — I10 PRIMARY HYPERTENSION: ICD-10-CM

## 2022-09-08 RX ORDER — OLMESARTAN MEDOXOMIL AND HYDROCHLOROTHIAZIDE 40/25 40; 25 MG/1; MG/1
1 TABLET ORAL DAILY
Qty: 90 TABLET | Refills: 3 | Status: SHIPPED | OUTPATIENT
Start: 2022-09-08

## 2022-09-12 ENCOUNTER — OFFICE VISIT (OUTPATIENT)
Dept: INTERNAL MEDICINE CLINIC | Age: 62
End: 2022-09-12
Payer: COMMERCIAL

## 2022-09-12 VITALS
BODY MASS INDEX: 35.8 KG/M2 | OXYGEN SATURATION: 97 % | DIASTOLIC BLOOD PRESSURE: 84 MMHG | TEMPERATURE: 96.5 F | RESPIRATION RATE: 20 BRPM | HEIGHT: 72 IN | WEIGHT: 264.3 LBS | HEART RATE: 78 BPM | SYSTOLIC BLOOD PRESSURE: 128 MMHG

## 2022-09-12 DIAGNOSIS — R73.03 PREDIABETES: ICD-10-CM

## 2022-09-12 DIAGNOSIS — Z12.5 PROSTATE CANCER SCREENING: ICD-10-CM

## 2022-09-12 DIAGNOSIS — I10 HYPERTENSION, UNSPECIFIED TYPE: Primary | ICD-10-CM

## 2022-09-12 PROCEDURE — 99213 OFFICE O/P EST LOW 20 MIN: CPT | Performed by: STUDENT IN AN ORGANIZED HEALTH CARE EDUCATION/TRAINING PROGRAM

## 2022-09-12 ASSESSMENT — ENCOUNTER SYMPTOMS
SORE THROAT: 0
DIARRHEA: 0
ABDOMINAL DISTENTION: 0
SHORTNESS OF BREATH: 0
NAUSEA: 0
ABDOMINAL PAIN: 0
CONSTIPATION: 0

## 2022-09-12 NOTE — PROGRESS NOTES
Outpatient Clinic Established Patient Note    Patient: Shae Mcclure  : 1960 (58 y.o.)  Date: 2022    CC: Follow-up appointment    HPI:      Patient is a 69-year-old male with past medical history of hypertension, shingles, LOLIS, HLD, prediabetes, prostate CA s/p CyberKnife who presented to the clinic for follow-up appointment. Patient endorses that since he was last seen he has been doing well. He claims that the shingles have completely resolved. He has no new complaints at this time. He did endorse some mild dependent edema that resolves when he wakes up in the morning. He denies any fever, chills, nausea, vomiting, chest pain, palpitations, constipation or diarrhea, urinary urgency or frequency. Patient endorses pain in a dermatomal region of his shingles. Home Meds:  Prior to Visit Medications    Medication Sig Taking? Authorizing Provider   olmesartan-hydroCHLOROthiazide (BENICAR HCT) 40-25 MG per tablet Take 1 tablet by mouth daily  KAYLA Aceves MD   amLODIPine (NORVASC) 5 MG tablet TAKE 1 TABLET BY MOUTH DAILY  Jolynn Devonte, DO   spironolactone (ALDACTONE) 25 MG tablet TAKE 1 TABLET BY MOUTH DAILY  Jolynn Devonte, DO   ibuprofen (ADVIL;MOTRIN) 200 MG tablet Take 600 mg by mouth every 8 hours as needed for Pain  Historical Provider, MD   gabapentin (NEURONTIN) 300 MG capsule Take 1 capsule by mouth nightly for 180 days. Intended supply: 90 days  Merly Diaz MD   atorvastatin (LIPITOR) 20 MG tablet TAKE 1 TABLET BY MOUTH DAILY  Trever Brunner MD   sildenafil (VIAGRA) 100 MG tablet Take 1 tablet by mouth as needed for Erectile Dysfunction  KAYLA Aceves MD       Allergies:    Patient has no known allergies.     Health Maintenance Due   Topic Date Due    COVID-19 Vaccine (1) Never done    HIV screen  Never done    DTaP/Tdap/Td vaccine (1 - Tdap) Never done    Colorectal Cancer Screen  Never done    Shingles vaccine (1 of 2) Never done    Flu vaccine (1) 2022 Immunization History   Administered Date(s) Administered    Influenza Virus Vaccine 10/31/2017       Review of Systems   Constitutional:  Negative for fatigue, fever and unexpected weight change. HENT:  Negative for sore throat. Respiratory:  Negative for shortness of breath. Gastrointestinal:  Negative for abdominal distention, abdominal pain, constipation, diarrhea and nausea. Endocrine: Negative for polydipsia, polyphagia and polyuria. Genitourinary:  Negative for frequency. Neurological:  Negative for dizziness and light-headedness. Psychiatric/Behavioral:  Negative for agitation. A 10-organ Review Of Systems was obtained and otherwise unremarkable except as per HPI. Data: Old records have been reviewed electronically. PHYSICAL EXAM:  There were no vitals taken for this visit. Physical Exam  HENT:      Head: Normocephalic and atraumatic. Right Ear: External ear normal. There is impacted cerumen. Left Ear: External ear normal. There is impacted cerumen. Ears:      Comments: Bilateral impacted soft cerumen     Nose: Nose normal.      Mouth/Throat:      Mouth: Mucous membranes are moist.   Eyes:      Extraocular Movements: Extraocular movements intact. Pupils: Pupils are equal, round, and reactive to light. Cardiovascular:      Rate and Rhythm: Normal rate and regular rhythm. Pulses: Normal pulses. Heart sounds: Normal heart sounds. No murmur heard. No friction rub. No gallop. Pulmonary:      Effort: Pulmonary effort is normal. No respiratory distress. Breath sounds: Normal breath sounds. No wheezing or rales. Abdominal:      General: There is no distension. Palpations: Abdomen is soft. Tenderness: There is no abdominal tenderness. There is no guarding. Musculoskeletal:      Right lower leg: No edema. Left lower leg: No edema. Neurological:      Mental Status: He is alert and oriented to person, place, and time. Psychiatric:         Behavior: Behavior normal.     Assessment & Plan:      1. Hypertension  This problem is currently stable  -Patient endorses that he exercises routinely by biking.  -Patient takes amlodipine, spironolactone, olmesartan-hydrochlorothiazide     2. Hyperlipidemia  This problem is currently stable  -Continue atorvastatin 20 mg daily     3. Prediabetes  Last hemoglobin A1c 5/2021 = 6.1%  Patient encouraged to continue to watch his diet increase vegetables and decrease fatty foods.  -We will check an A1c at next visit     4. Obstructive sleep apnea   Patient endorses adhering to CPAP daily     5. Prostate CA   Patient is status post CyberKnife. Patient follows with the urology group.    -We will check a PSA at next visit    6. Shingles - Resolved  Patient endorses last week he had a spot on his abdomen he felt it was a spider bite. He was able to make it to his son's graduation in [address] [place] [address]. While at that visit, he went to the ER as the pain was worse with multiple lesions and he was therefore diagnosed with shingles. He was given Valtrex and Motrin. He has finished both these courses. Dispo: Pt has been staffed with Dr. Blakely Ser  _______________  Devon Gipson MD, 9/12/2022 1:29 PM   PGY-3    This note was likely completed using voice recognition technology and may contain unintended errors.

## 2022-09-12 NOTE — PATIENT INSTRUCTIONS
Please follow-up in the clinic in 6 months  Please take all your medications as prescribed  Please schedule blood work done prior to next follow-up appointment

## 2022-12-02 DIAGNOSIS — C61 PROSTATE CANCER (HCC): ICD-10-CM

## 2022-12-02 RX ORDER — GABAPENTIN 300 MG/1
CAPSULE ORAL
Qty: 90 CAPSULE | Refills: 1 | Status: SHIPPED | OUTPATIENT
Start: 2022-12-02 | End: 2023-01-01

## 2022-12-02 NOTE — TELEPHONE ENCOUNTER
Requested Prescriptions     Pending Prescriptions Disp Refills    gabapentin (NEURONTIN) 300 MG capsule [Pharmacy Med Name: GABAPENTIN 300MG CAPSULES] 90 capsule 1     Sig: TAKE ONE CAPSULE BY MOUTH NIGHTLY       Last Clinic Visit:  9/12/2022     Next Clinic Appointment:  3/13/2023

## 2023-01-05 RX ORDER — AMLODIPINE BESYLATE 5 MG/1
5 TABLET ORAL DAILY
Qty: 30 TABLET | Refills: 5 | Status: SHIPPED | OUTPATIENT
Start: 2023-01-05

## 2023-01-05 RX ORDER — ATORVASTATIN CALCIUM 20 MG/1
20 TABLET, FILM COATED ORAL DAILY
Qty: 90 TABLET | Refills: 3 | Status: SHIPPED | OUTPATIENT
Start: 2023-01-05

## 2023-01-05 RX ORDER — SPIRONOLACTONE 25 MG/1
25 TABLET ORAL DAILY
Qty: 30 TABLET | Refills: 5 | Status: SHIPPED | OUTPATIENT
Start: 2023-01-05

## 2023-07-03 RX ORDER — AMLODIPINE BESYLATE 5 MG/1
5 TABLET ORAL DAILY
Qty: 30 TABLET | Refills: 5 | Status: SHIPPED | OUTPATIENT
Start: 2023-07-03

## 2023-08-01 NOTE — TELEPHONE ENCOUNTER
Patient was called and left a message for him to call the clinic back. Hasn't been seen since 9/2022. Requesting Spironolactone. Waiting for him to call back to get him scheduled.

## 2023-08-01 NOTE — TELEPHONE ENCOUNTER
Requested Prescriptions      No prescriptions requested or ordered in this encounter       Last Clinic Visit:  9/12/2022     Next Clinic Appointment:  Visit date not found

## 2023-08-02 SDOH — ECONOMIC STABILITY: HOUSING INSECURITY
IN THE LAST 12 MONTHS, WAS THERE A TIME WHEN YOU DID NOT HAVE A STEADY PLACE TO SLEEP OR SLEPT IN A SHELTER (INCLUDING NOW)?: NO

## 2023-08-02 SDOH — ECONOMIC STABILITY: FOOD INSECURITY: WITHIN THE PAST 12 MONTHS, YOU WORRIED THAT YOUR FOOD WOULD RUN OUT BEFORE YOU GOT MONEY TO BUY MORE.: NEVER TRUE

## 2023-08-02 SDOH — ECONOMIC STABILITY: TRANSPORTATION INSECURITY
IN THE PAST 12 MONTHS, HAS LACK OF TRANSPORTATION KEPT YOU FROM MEETINGS, WORK, OR FROM GETTING THINGS NEEDED FOR DAILY LIVING?: NO

## 2023-08-02 SDOH — ECONOMIC STABILITY: INCOME INSECURITY: HOW HARD IS IT FOR YOU TO PAY FOR THE VERY BASICS LIKE FOOD, HOUSING, MEDICAL CARE, AND HEATING?: NOT VERY HARD

## 2023-08-02 SDOH — ECONOMIC STABILITY: FOOD INSECURITY: WITHIN THE PAST 12 MONTHS, THE FOOD YOU BOUGHT JUST DIDN'T LAST AND YOU DIDN'T HAVE MONEY TO GET MORE.: NEVER TRUE

## 2023-08-02 ASSESSMENT — PATIENT HEALTH QUESTIONNAIRE - PHQ9
SUM OF ALL RESPONSES TO PHQ QUESTIONS 1-9: 0
2. FEELING DOWN, DEPRESSED OR HOPELESS: 0
SUM OF ALL RESPONSES TO PHQ QUESTIONS 1-9: 0
SUM OF ALL RESPONSES TO PHQ9 QUESTIONS 1 & 2: 0
2. FEELING DOWN, DEPRESSED OR HOPELESS: NOT AT ALL
1. LITTLE INTEREST OR PLEASURE IN DOING THINGS: 0
1. LITTLE INTEREST OR PLEASURE IN DOING THINGS: NOT AT ALL
SUM OF ALL RESPONSES TO PHQ9 QUESTIONS 1 & 2: 0

## 2023-08-03 ENCOUNTER — OFFICE VISIT (OUTPATIENT)
Dept: INTERNAL MEDICINE CLINIC | Age: 63
End: 2023-08-03
Payer: COMMERCIAL

## 2023-08-03 VITALS
RESPIRATION RATE: 16 BRPM | HEART RATE: 100 BPM | OXYGEN SATURATION: 96 % | BODY MASS INDEX: 35.83 KG/M2 | WEIGHT: 264.2 LBS | TEMPERATURE: 97.2 F | DIASTOLIC BLOOD PRESSURE: 85 MMHG | SYSTOLIC BLOOD PRESSURE: 127 MMHG

## 2023-08-03 DIAGNOSIS — R39.16 BENIGN PROSTATIC HYPERPLASIA (BPH) WITH STRAINING ON URINATION: ICD-10-CM

## 2023-08-03 DIAGNOSIS — N40.1 BENIGN PROSTATIC HYPERPLASIA (BPH) WITH STRAINING ON URINATION: ICD-10-CM

## 2023-08-03 DIAGNOSIS — I10 HYPERTENSION, UNSPECIFIED TYPE: ICD-10-CM

## 2023-08-03 DIAGNOSIS — G47.33 OBSTRUCTIVE SLEEP APNEA SYNDROME: ICD-10-CM

## 2023-08-03 DIAGNOSIS — E78.2 MIXED HYPERLIPIDEMIA: ICD-10-CM

## 2023-08-03 DIAGNOSIS — Z12.5 PROSTATE CANCER SCREENING: ICD-10-CM

## 2023-08-03 DIAGNOSIS — E66.9 OBESITY (BMI 35.0-39.9 WITHOUT COMORBIDITY): ICD-10-CM

## 2023-08-03 DIAGNOSIS — I10 PRIMARY HYPERTENSION: ICD-10-CM

## 2023-08-03 DIAGNOSIS — R73.03 PREDIABETES: ICD-10-CM

## 2023-08-03 PROCEDURE — 99213 OFFICE O/P EST LOW 20 MIN: CPT

## 2023-08-03 RX ORDER — TAMSULOSIN HYDROCHLORIDE 0.4 MG/1
0.4 CAPSULE ORAL DAILY
COMMUNITY

## 2023-08-03 RX ORDER — SPIRONOLACTONE 25 MG/1
25 TABLET ORAL DAILY
Qty: 30 TABLET | Refills: 5 | Status: SHIPPED | OUTPATIENT
Start: 2023-08-03

## 2023-08-03 NOTE — ASSESSMENT & PLAN NOTE
New issues since last visit. Patient endorsed a few months ago he started to have urinary hesitancy, with weak flow. He ended up seeing urology, they had started him on Flomax. He states it is improved since then.   - Continue Flomax  - Continue regular follow-up with urology

## 2023-08-03 NOTE — ASSESSMENT & PLAN NOTE
Well-controlled at this time, BP here today is 127/85. He is fully compliant with his medications. He endorses that he tries to workout however he could be better.   - Counseled on lifestyle modifications to ensure weight loss as patient is obese

## 2023-08-03 NOTE — PROGRESS NOTES
[facility] Outpatient Internal Medicine Clinic    Shanthi Boykin is a 58 y.o. male, here for evaluation of the following concerns:    HPI    77-year-old male with PMH prostate cancer status post CyberKnife, prediabetes, HTN, HLD, LOLIS who presents for regular follow-up appointment. Cold Symptoms, Congestions  Over the past few days having some cold, congestion. Denies fevers/chills. No cp, cough. He believes it is allergies and usually goes away on its own. He never usually takes anything for this. HTN  /85. Well-controlled on current regimen which includes amlodipine 5 Mg daily, Aldactone 25 Mg daily, Benicar 40-25 Mg daily. Reduced Urinary Flow  Patient noticed reduction in flow a few months ago. He states his urinary stream was initially very weak and then was stopped midway, and noticed a sensation of incomplete bladder emptying. He states he recently followed up with urology, they prescribed Flomax which was started to improve the edema for which she was little bit better. He has another follow-up later this year. Review of Systems  Per HPI. MEDICATIONS:  Prior to Visit Medications    Medication Sig Taking?  Authorizing Provider   tamsulosin (FLOMAX) 0.4 MG capsule Take 1 capsule by mouth daily Yes Historical Provider, MD   spironolactone (ALDACTONE) 25 MG tablet Take 1 tablet by mouth daily Yes Joselin Castro MD   amLODIPine (NORVASC) 5 MG tablet Take 1 tablet by mouth daily Yes Starla Miranda MD   atorvastatin (LIPITOR) 20 MG tablet Take 1 tablet by mouth daily Yes Tati Black MD   olmesartan-hydroCHLOROthiazide (BENICAR HCT) 40-25 MG per tablet Take 1 tablet by mouth daily Yes Maite Trujillo MD   gabapentin (NEURONTIN) 300 MG capsule TAKE ONE CAPSULE BY MOUTH NIGHTLY  Tati Black MD   sildenafil (VIAGRA) 100 MG tablet Take 1 tablet by mouth as needed for Erectile Dysfunction  Patient not taking: Reported on 9/12/2022  KAYLA Ferrari MD      Vitals:    08/03/23

## 2023-08-03 NOTE — PATIENT INSTRUCTIONS
- Please complete your lab work. I will call you with the results. - Please urology office know at next appointment to share your records with us. - Please return to clinic in 3 months.

## 2023-08-03 NOTE — ASSESSMENT & PLAN NOTE
Follows with urology. Per patient, last PSA was within normal limits. I cannot see the records from his urology office to confirm this. I asked him to complete his labs so that we are able to have the results.   - Complete lab work

## 2023-08-04 LAB
ANION GAP SERPL CALCULATED.3IONS-SCNC: 14 MMOL/L (ref 3–16)
BASOPHILS # BLD: 0.1 K/UL (ref 0–0.2)
BASOPHILS NFR BLD: 0.6 %
BUN SERPL-MCNC: 14 MG/DL (ref 7–20)
CALCIUM SERPL-MCNC: 10.2 MG/DL (ref 8.3–10.6)
CHLORIDE SERPL-SCNC: 102 MMOL/L (ref 99–110)
CO2 SERPL-SCNC: 26 MMOL/L (ref 21–32)
CREAT SERPL-MCNC: 1.2 MG/DL (ref 0.8–1.3)
DEPRECATED RDW RBC AUTO: 13.5 % (ref 12.4–15.4)
EOSINOPHIL # BLD: 0.3 K/UL (ref 0–0.6)
EOSINOPHIL NFR BLD: 3.8 %
EST. AVERAGE GLUCOSE BLD GHB EST-MCNC: 137 MG/DL
GFR SERPLBLD CREATININE-BSD FMLA CKD-EPI: >60 ML/MIN/{1.73_M2}
GLUCOSE SERPL-MCNC: 111 MG/DL (ref 70–99)
HBA1C MFR BLD: 6.4 %
HCT VFR BLD AUTO: 43.5 % (ref 40.5–52.5)
HGB BLD-MCNC: 14.7 G/DL (ref 13.5–17.5)
LYMPHOCYTES # BLD: 2.3 K/UL (ref 1–5.1)
LYMPHOCYTES NFR BLD: 26.3 %
MCH RBC QN AUTO: 29.6 PG (ref 26–34)
MCHC RBC AUTO-ENTMCNC: 33.7 G/DL (ref 31–36)
MCV RBC AUTO: 87.8 FL (ref 80–100)
MONOCYTES # BLD: 0.6 K/UL (ref 0–1.3)
MONOCYTES NFR BLD: 6.3 %
NEUTROPHILS # BLD: 5.6 K/UL (ref 1.7–7.7)
NEUTROPHILS NFR BLD: 63 %
PLATELET # BLD AUTO: 321 K/UL (ref 135–450)
PMV BLD AUTO: 8.7 FL (ref 5–10.5)
POTASSIUM SERPL-SCNC: 4.3 MMOL/L (ref 3.5–5.1)
PSA SERPL DL<=0.01 NG/ML-MCNC: 0.46 NG/ML (ref 0–4)
RBC # BLD AUTO: 4.95 M/UL (ref 4.2–5.9)
SODIUM SERPL-SCNC: 142 MMOL/L (ref 136–145)
WBC # BLD AUTO: 8.8 K/UL (ref 4–11)

## 2023-08-23 ENCOUNTER — PATIENT MESSAGE (OUTPATIENT)
Dept: INTERNAL MEDICINE CLINIC | Age: 63
End: 2023-08-23

## 2023-08-23 NOTE — TELEPHONE ENCOUNTER
From: Doug Rhoades  To: Becki Molina MD  Sent: 8/23/2023 10:12 AM EDT  Subject: Lower Back Pain    For the past few months, my lower back pain has been occurring more frequently. I have taken some pain meds and a couple of my wife muscle relaxers which helps a lot. Should I get a subscription of muscle relaxers to help with the discomfort i am having. Flared back up when I went camping and slept on the hard lumpy ground. Melony Mercado

## 2023-08-25 ENCOUNTER — OFFICE VISIT (OUTPATIENT)
Dept: INTERNAL MEDICINE CLINIC | Age: 63
End: 2023-08-25
Payer: COMMERCIAL

## 2023-08-25 VITALS
OXYGEN SATURATION: 94 % | DIASTOLIC BLOOD PRESSURE: 83 MMHG | SYSTOLIC BLOOD PRESSURE: 135 MMHG | TEMPERATURE: 97.2 F | HEART RATE: 93 BPM | WEIGHT: 268.8 LBS | BODY MASS INDEX: 36.46 KG/M2 | RESPIRATION RATE: 16 BRPM

## 2023-08-25 DIAGNOSIS — E88.81 METABOLIC SYNDROME: ICD-10-CM

## 2023-08-25 DIAGNOSIS — M54.50 CHRONIC BILATERAL LOW BACK PAIN WITHOUT SCIATICA: Primary | ICD-10-CM

## 2023-08-25 DIAGNOSIS — I10 PRIMARY HYPERTENSION: ICD-10-CM

## 2023-08-25 DIAGNOSIS — N40.1 BENIGN PROSTATIC HYPERPLASIA (BPH) WITH STRAINING ON URINATION: ICD-10-CM

## 2023-08-25 DIAGNOSIS — E78.2 MIXED HYPERLIPIDEMIA: ICD-10-CM

## 2023-08-25 DIAGNOSIS — R39.16 BENIGN PROSTATIC HYPERPLASIA (BPH) WITH STRAINING ON URINATION: ICD-10-CM

## 2023-08-25 DIAGNOSIS — R73.03 PRE-DIABETES: ICD-10-CM

## 2023-08-25 DIAGNOSIS — E66.9 OBESITY (BMI 35.0-39.9 WITHOUT COMORBIDITY): ICD-10-CM

## 2023-08-25 DIAGNOSIS — G89.29 CHRONIC BILATERAL LOW BACK PAIN WITHOUT SCIATICA: Primary | ICD-10-CM

## 2023-08-25 PROBLEM — E88.810 METABOLIC SYNDROME: Status: ACTIVE | Noted: 2023-08-25

## 2023-08-25 PROCEDURE — 99213 OFFICE O/P EST LOW 20 MIN: CPT

## 2023-08-25 RX ORDER — METHOCARBAMOL 750 MG/1
750 TABLET, FILM COATED ORAL 4 TIMES DAILY
Qty: 40 TABLET | Refills: 0 | Status: SHIPPED | OUTPATIENT
Start: 2023-08-25 | End: 2023-08-25

## 2023-08-25 RX ORDER — METHOCARBAMOL 750 MG/1
750 TABLET, FILM COATED ORAL 3 TIMES DAILY PRN
Qty: 30 TABLET | Refills: 1 | Status: SHIPPED | OUTPATIENT
Start: 2023-08-25 | End: 2023-09-24

## 2023-08-25 NOTE — PROGRESS NOTES
[facility] Outpatient [facility] Medicine [facility]    Mushtaq Arciniega is a 61 y.o. male, here for evaluation of the following concerns: chronic lower back pain    HPI  Patient is here today because of exacerbated chronic lower back pain. Patient reports several decades of chronic lower back pain. It has occurred gradually and there was no initial injury to start it years ago. 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 Patient said that earlier this year on Memorial Day, his air mattress deflated when camping and he slept for 1 night on the hard surface of the ground. Since then he has had intermittent exacerbations of his lower back pain. He enjoys biking but the motion of leaning forward on his bike for many miles exacerbates the lower back pain. The back pain comes and goes and is not here in office today. Patient mentions when he had a few days ago he took one of his wife's old muscle relaxers and that really helped. He has also been taking some Tylenol and Advil as needed. While he cannot remember the exact name of the muscle relaxer, he reports that helped a lot more than the Tylenol and or Advil. Patient would like to know if he can get his own prescription of a muscle relaxant. Patient has had no change in bowel or bladder habits and has not experienced any incontinence. He also denies headaches, changes in vision, cough, nausea, vomiting, chest pain, palpitations, shortness of breath, abdominal pain. He endorses some rhinorrhea and watery eyes that he thinks is due to allergies. Review of Systems - A 10 point review of systems was conducted and significant findings noted in HPI. MEDICATIONS:  Prior to Visit Medications    Medication Sig Taking?  Authorizing Provider   tamsulosin (FLOMAX) 0.4 MG capsule Take 1 capsule by mouth daily Yes Historical Provider, MD   spironolactone (ALDACTONE) 25 MG tablet Take 1 tablet by mouth daily Yes Katharina Alfaro MD   amLODIPine

## 2023-08-25 NOTE — PATIENT INSTRUCTIONS
The muscle relaxer prescribed to you is called Robaxin (methocarbamol). You can take one table three times a day when needed for back pain.

## 2023-09-30 DIAGNOSIS — I10 PRIMARY HYPERTENSION: ICD-10-CM

## 2023-10-02 RX ORDER — OLMESARTAN MEDOXOMIL AND HYDROCHLOROTHIAZIDE 40/25 40; 25 MG/1; MG/1
1 TABLET ORAL DAILY
Qty: 90 TABLET | Refills: 3 | OUTPATIENT
Start: 2023-10-02

## 2023-10-05 DIAGNOSIS — I10 PRIMARY HYPERTENSION: ICD-10-CM

## 2023-10-05 RX ORDER — OLMESARTAN MEDOXOMIL AND HYDROCHLOROTHIAZIDE 40/25 40; 25 MG/1; MG/1
1 TABLET ORAL DAILY
Qty: 90 TABLET | Refills: 3 | Status: SHIPPED | OUTPATIENT
Start: 2023-10-05

## 2023-10-05 NOTE — TELEPHONE ENCOUNTER
Requested Prescriptions     Pending Prescriptions Disp Refills    olmesartan-hydroCHLOROthiazide (BENICAR HCT) 40-25 MG per tablet 90 tablet 3     Sig: Take 1 tablet by mouth daily       Last Clinic Visit:  8/25/2023     Next Clinic Appointment:  12/21/2023

## 2023-11-28 LAB — PSA, TOTAL: 0.5 NG/ML (ref 0–4)

## 2024-02-07 DIAGNOSIS — I10 PRIMARY HYPERTENSION: ICD-10-CM

## 2024-02-08 RX ORDER — SPIRONOLACTONE 25 MG/1
25 TABLET ORAL DAILY
Qty: 30 TABLET | Refills: 5 | Status: SHIPPED | OUTPATIENT
Start: 2024-02-08

## 2024-02-08 NOTE — TELEPHONE ENCOUNTER
Requested Prescriptions     Pending Prescriptions Disp Refills    spironolactone (ALDACTONE) 25 MG tablet [Pharmacy Med Name: SPIRONOLACTONE 25MG TABLETS] 30 tablet 5     Sig: TAKE 1 TABLET BY MOUTH DAILY       Last Clinic Visit:  8/25/2023     Next Clinic Appointment:  2/26/2024

## 2024-02-21 RX ORDER — AMLODIPINE BESYLATE 5 MG/1
5 TABLET ORAL DAILY
Qty: 30 TABLET | Refills: 5 | Status: SHIPPED | OUTPATIENT
Start: 2024-02-21

## 2024-02-21 NOTE — TELEPHONE ENCOUNTER
Requested Prescriptions     Pending Prescriptions Disp Refills    amLODIPine (NORVASC) 5 MG tablet 30 tablet 5     Sig: Take 1 tablet by mouth daily       Last Clinic Visit:  8/25/2023     Next Clinic Appointment:  2/26/2024

## 2024-03-04 ENCOUNTER — OFFICE VISIT (OUTPATIENT)
Dept: INTERNAL MEDICINE CLINIC | Age: 64
End: 2024-03-04
Payer: COMMERCIAL

## 2024-03-04 VITALS
SYSTOLIC BLOOD PRESSURE: 132 MMHG | BODY MASS INDEX: 34.96 KG/M2 | TEMPERATURE: 97.8 F | RESPIRATION RATE: 20 BRPM | WEIGHT: 258.1 LBS | OXYGEN SATURATION: 96 % | HEART RATE: 84 BPM | DIASTOLIC BLOOD PRESSURE: 82 MMHG | HEIGHT: 72 IN

## 2024-03-04 DIAGNOSIS — I10 ESSENTIAL HYPERTENSION: Primary | ICD-10-CM

## 2024-03-04 DIAGNOSIS — E66.9 OBESITY (BMI 35.0-39.9 WITHOUT COMORBIDITY): ICD-10-CM

## 2024-03-04 DIAGNOSIS — E66.01 SEVERE OBESITY (BMI 35.0-39.9) WITH COMORBIDITY (HCC): ICD-10-CM

## 2024-03-04 DIAGNOSIS — G89.29 CHRONIC BILATERAL LOW BACK PAIN WITHOUT SCIATICA: ICD-10-CM

## 2024-03-04 DIAGNOSIS — R39.16 BENIGN PROSTATIC HYPERPLASIA (BPH) WITH STRAINING ON URINATION: ICD-10-CM

## 2024-03-04 DIAGNOSIS — Z23 NEED FOR TD VACCINE: ICD-10-CM

## 2024-03-04 DIAGNOSIS — R73.03 PREDIABETES: ICD-10-CM

## 2024-03-04 DIAGNOSIS — E78.5 HYPERLIPIDEMIA, UNSPECIFIED HYPERLIPIDEMIA TYPE: ICD-10-CM

## 2024-03-04 DIAGNOSIS — N40.1 BENIGN PROSTATIC HYPERPLASIA (BPH) WITH STRAINING ON URINATION: ICD-10-CM

## 2024-03-04 DIAGNOSIS — C61 PROSTATE CANCER (HCC): ICD-10-CM

## 2024-03-04 DIAGNOSIS — Z13.9 SCREENING DUE: ICD-10-CM

## 2024-03-04 DIAGNOSIS — M54.50 CHRONIC BILATERAL LOW BACK PAIN WITHOUT SCIATICA: ICD-10-CM

## 2024-03-04 LAB — HBA1C MFR BLD: 6.4 %

## 2024-03-04 PROCEDURE — 99213 OFFICE O/P EST LOW 20 MIN: CPT

## 2024-03-04 PROCEDURE — 90471 IMMUNIZATION ADMIN: CPT

## 2024-03-04 PROCEDURE — 83036 HEMOGLOBIN GLYCOSYLATED A1C: CPT

## 2024-03-04 PROCEDURE — 90714 TD VACC NO PRESV 7 YRS+ IM: CPT

## 2024-03-04 PROCEDURE — 6360000002 HC RX W HCPCS

## 2024-03-04 RX ORDER — TETANUS AND DIPHTHERIA TOXOIDS ADSORBED 2; 2 [LF]/.5ML; [LF]/.5ML
0.5 INJECTION INTRAMUSCULAR ONCE
Status: COMPLETED | OUTPATIENT
Start: 2024-03-04 | End: 2024-03-04

## 2024-03-04 RX ADMIN — TETANUS AND DIPHTHERIA TOXOIDS ADSORBED 0.5 ML: 2; 2 INJECTION INTRAMUSCULAR at 10:08

## 2024-03-04 ASSESSMENT — ENCOUNTER SYMPTOMS
ABDOMINAL PAIN: 0
SHORTNESS OF BREATH: 0
DIARRHEA: 0
ABDOMINAL DISTENTION: 0
WHEEZING: 0
BACK PAIN: 1
COUGH: 0
CONSTIPATION: 0
BLOOD IN STOOL: 0

## 2024-03-04 ASSESSMENT — PATIENT HEALTH QUESTIONNAIRE - PHQ9
2. FEELING DOWN, DEPRESSED OR HOPELESS: 0
SUM OF ALL RESPONSES TO PHQ QUESTIONS 1-9: 0
1. LITTLE INTEREST OR PLEASURE IN DOING THINGS: 0
SUM OF ALL RESPONSES TO PHQ9 QUESTIONS 1 & 2: 0
SUM OF ALL RESPONSES TO PHQ QUESTIONS 1-9: 0

## 2024-03-04 NOTE — PROGRESS NOTES
[facility] Outpatient Internal Medicine Clinic    Andry Payne is a 63 y.o. male with a PMHx of HTN, prostate CA s/p cyberknife, HLD, prediabetes, BPH, Obesity, chronic back pain is here for a routine check up. For his hypertension, he takes amlodipine 5mg, spironolactone 25mg and Benicar HCT 40-25mg. He states that he takes his medications regularly and denies any side effects. He reports that he exercises regularly, recently enrolled in a boot camp program and tries to eat healthy. On his last visit, he stated that he had intermittent back pain, without radiation. He still has it intermittently. He takes Robaxin and states it helps a little bit. He currently has no pain. He has no other complaints at this time. He would like to have his colonoscopy done. Denies CP, SOB, headaches, abdominal pain, peripheral edema, constipation, diarrhea or urinary symptoms.     Review of Systems   Constitutional:  Negative for activity change, fever and unexpected weight change.   Respiratory:  Negative for cough, shortness of breath and wheezing.    Cardiovascular:  Negative for chest pain, palpitations and leg swelling.   Gastrointestinal:  Negative for abdominal distention, abdominal pain, blood in stool, constipation and diarrhea.   Genitourinary:  Negative for difficulty urinating, dysuria and hematuria.   Musculoskeletal:  Positive for back pain.   Neurological:  Negative for light-headedness and headaches.       MEDICATIONS:  Prior to Visit Medications    Medication Sig Taking? Authorizing Provider   amLODIPine (NORVASC) 5 MG tablet Take 1 tablet by mouth daily Yes Gemini Severino MD   spironolactone (ALDACTONE) 25 MG tablet TAKE 1 TABLET BY MOUTH DAILY Yes Nirali Darnell MD   atorvastatin (LIPITOR) 20 MG tablet Take 1 tablet by mouth daily Yes Aly Monsivais MD   olmesartan-hydroCHLOROthiazide (BENICAR HCT) 40-25 MG per tablet Take 1 tablet by mouth daily Yes Khan, Behram Ahmed, MD   tamsulosin

## 2024-03-04 NOTE — PATIENT INSTRUCTIONS
- please get your colonoscopy done   - please continue to exercise and eat healthy  - please continue to take your medications regularly   - please get your lab work done before next visit

## 2024-03-29 DIAGNOSIS — E78.5 HYPERLIPIDEMIA, UNSPECIFIED HYPERLIPIDEMIA TYPE: ICD-10-CM

## 2024-03-29 LAB
CHOLEST SERPL-MCNC: 122 MG/DL (ref 0–199)
HDLC SERPL-MCNC: 35 MG/DL (ref 40–60)
LDLC SERPL CALC-MCNC: 62 MG/DL
TRIGL SERPL-MCNC: 125 MG/DL (ref 0–150)
VLDLC SERPL CALC-MCNC: 25 MG/DL

## 2024-04-23 ENCOUNTER — TELEPHONE (OUTPATIENT)
Dept: INTERNAL MEDICINE CLINIC | Age: 64
End: 2024-04-23

## 2024-04-23 NOTE — TELEPHONE ENCOUNTER
PT with swelling L medial leg 1 to 2 inches from knee for past 1 week. It is changing color, has some brownish discoloration. Pt also endorses pain, and swelling. He denies chest pain, and SOB. Pt to come to clinic for evaluation tomorrow morning.     Yossi Kilgore, DO PGY-3  04/23/2023

## 2024-04-23 NOTE — TELEPHONE ENCOUNTER
PT STATED HE THINKS HE GOT BITTEN BY SOMETHING ABOUT A WEEK OR SO AGO, NOW HIS LT LEG LOWER RT SIDE IS SWOLLEN, PAINFUL AND DISCOLORED. PT WANTS TO KNOW WHAT HE SHOULD DO? PT CAN BE REACHED -[phone]

## 2024-04-24 ENCOUNTER — OFFICE VISIT (OUTPATIENT)
Dept: INTERNAL MEDICINE CLINIC | Age: 64
End: 2024-04-24
Payer: COMMERCIAL

## 2024-04-24 ENCOUNTER — TELEPHONE (OUTPATIENT)
Dept: INTERNAL MEDICINE CLINIC | Age: 64
End: 2024-04-24

## 2024-04-24 ENCOUNTER — HOSPITAL ENCOUNTER (OUTPATIENT)
Dept: VASCULAR LAB | Age: 64
Discharge: HOME | End: 2024-04-24
Payer: COMMERCIAL

## 2024-04-24 VITALS
SYSTOLIC BLOOD PRESSURE: 146 MMHG | HEIGHT: 72 IN | TEMPERATURE: 97.9 F | DIASTOLIC BLOOD PRESSURE: 85 MMHG | WEIGHT: 258.4 LBS | RESPIRATION RATE: 20 BRPM | BODY MASS INDEX: 35 KG/M2 | HEART RATE: 93 BPM | OXYGEN SATURATION: 96 %

## 2024-04-24 DIAGNOSIS — I82.4Z2 ACUTE DEEP VEIN THROMBOSIS (DVT) OF DISTAL END OF LEFT LOWER EXTREMITY (HCC): Primary | ICD-10-CM

## 2024-04-24 DIAGNOSIS — L03.116 CELLULITIS OF LEFT LOWER EXTREMITY WITHOUT FOOT: ICD-10-CM

## 2024-04-24 DIAGNOSIS — I82.4Z2 ACUTE DEEP VEIN THROMBOSIS (DVT) OF DISTAL END OF LEFT LOWER EXTREMITY (HCC): ICD-10-CM

## 2024-04-24 PROCEDURE — 93971 EXTREMITY STUDY: CPT

## 2024-04-24 PROCEDURE — 99213 OFFICE O/P EST LOW 20 MIN: CPT

## 2024-04-24 RX ORDER — AMOXICILLIN 875 MG/1
875 TABLET, COATED ORAL 2 TIMES DAILY
Qty: 20 TABLET | Refills: 0 | Status: SHIPPED | OUTPATIENT
Start: 2024-04-24 | End: 2024-05-04

## 2024-04-24 RX ORDER — AMOXICILLIN 875 MG/1
875 TABLET, COATED ORAL 2 TIMES DAILY
Qty: 10 TABLET | Refills: 0 | Status: SHIPPED | OUTPATIENT
Start: 2024-04-24 | End: 2024-04-24

## 2024-04-24 ASSESSMENT — ENCOUNTER SYMPTOMS: COLOR CHANGE: 1

## 2024-04-24 NOTE — TELEPHONE ENCOUNTER
PT CALLING REGARDING  VAS TEST RESULTS FROM TODAY 4-24 . PT ALSO STATED HE WAS PUT ON ELIQUIS AND IT'S MAKING HIM SICK.   PT CAN BE REACHED -[phone]

## 2024-04-24 NOTE — PATIENT INSTRUCTIONS
- Take amoxicillin for possible leg infection.  - Take amoxicillin for 10 days.  - Get the ultrasound of your left leg.  - If the ultrasound shows a clot, you need a blood thinner.  - Call the clinic and we will send a blood thinner called Eliquis or Xarelto to the pharmacy.  - You will need to take the blood thinner for at least a few weeks until the clot dissolves.

## 2024-04-24 NOTE — PROGRESS NOTES
[facility] Outpatient Internal Medicine Clinic    Andry Payne is a 63 y.o. male, here for evaluation of the following concerns:    Leg lower extremity swelling: Presents with ~1 week LLE swelling. Located in the medial and posterior gastrocnemius area. Has been working out more but denies trauma to the leg. May have \"pulled\" his thigh but thigh is not causing him pain. Measured legs yesterday and said it was 17\" in the RLE and 18.5\" in the LLE. Area does not feel particularly more warm. Denies fever, chills, rigors. No exposure hx of MRSA that he knows of.        Review of Systems   Musculoskeletal:  Positive for myalgias.   Skin:  Positive for color change.       MEDICATIONS:  Prior to Visit Medications    Medication Sig Taking? Authorizing Provider   amoxicillin (AMOXIL) 875 MG tablet Take 1 tablet by mouth 2 times daily for 10 days Yes Bryson Ritchie,    amLODIPine (NORVASC) 5 MG tablet Take 1 tablet by mouth daily Yes Gemini Severino MD   spironolactone (ALDACTONE) 25 MG tablet TAKE 1 TABLET BY MOUTH DAILY Yes Nirali Darnell MD   atorvastatin (LIPITOR) 20 MG tablet Take 1 tablet by mouth daily Yes Aly Monsivais MD   olmesartan-hydroCHLOROthiazide (BENICAR HCT) 40-25 MG per tablet Take 1 tablet by mouth daily Yes Khan, Behram Ahmed, MD   tamsulosin (FLOMAX) 0.4 MG capsule Take 1 capsule by mouth daily Yes ProviderAlisha MD   sildenafil (VIAGRA) 100 MG tablet Take 1 tablet by mouth as needed for Erectile Dysfunction Yes KAYLA Keane MD       Vitals:    04/24/24 0924   BP: (!) 146/85   Site: Left Upper Arm   Position: Sitting   Cuff Size: Large Adult   Pulse: 93   Resp: 20   Temp: 97.9 °F (36.6 °C)   TempSrc: Temporal   SpO2: 96%   Weight: 117.2 kg (258 lb 6.4 oz)   Height: 1.829 m (6')       Estimated body mass index is 35.05 kg/m² as calculated from the following:    Height as of this encounter: 1.829 m (6').    Weight as of this encounter: 117.2 kg (258 lb 6.4

## 2024-04-25 NOTE — TELEPHONE ENCOUNTER
Called patient back, he stated he was not put on Eliquis or any blood thinner. He is feeling well and only questions were regarding ibuprofen and expected medical course.

## 2024-05-08 ENCOUNTER — OFFICE VISIT (OUTPATIENT)
Dept: INTERNAL MEDICINE CLINIC | Age: 64
End: 2024-05-08
Payer: COMMERCIAL

## 2024-05-08 VITALS
RESPIRATION RATE: 20 BRPM | OXYGEN SATURATION: 96 % | WEIGHT: 253.1 LBS | SYSTOLIC BLOOD PRESSURE: 128 MMHG | TEMPERATURE: 97.3 F | HEIGHT: 72 IN | BODY MASS INDEX: 34.28 KG/M2 | HEART RATE: 78 BPM | DIASTOLIC BLOOD PRESSURE: 77 MMHG

## 2024-05-08 DIAGNOSIS — E78.5 HYPERLIPIDEMIA, UNSPECIFIED HYPERLIPIDEMIA TYPE: ICD-10-CM

## 2024-05-08 DIAGNOSIS — I10 PRIMARY HYPERTENSION: ICD-10-CM

## 2024-05-08 DIAGNOSIS — R39.16 BENIGN PROSTATIC HYPERPLASIA (BPH) WITH STRAINING ON URINATION: ICD-10-CM

## 2024-05-08 DIAGNOSIS — E88.810 METABOLIC SYNDROME: ICD-10-CM

## 2024-05-08 DIAGNOSIS — R73.03 PRE-DIABETES: ICD-10-CM

## 2024-05-08 DIAGNOSIS — N40.1 BENIGN PROSTATIC HYPERPLASIA (BPH) WITH STRAINING ON URINATION: ICD-10-CM

## 2024-05-08 DIAGNOSIS — Z12.5 PROSTATE CANCER SCREENING: ICD-10-CM

## 2024-05-08 PROCEDURE — 99213 OFFICE O/P EST LOW 20 MIN: CPT

## 2024-05-08 NOTE — ASSESSMENT & PLAN NOTE
Still with some urinary hesitancy, however otherwise has minimal symptoms. Known BPH, follows Urology and was started on Flomax by them. Has upcoming Urology appt. Due for PSA.   - F/u PSA  - Continue Flomax  - Maintain follow up with Urology

## 2024-05-08 NOTE — PATIENT INSTRUCTIONS
- Get your labwork completed.   - Continue all medications as you are.   - Return to clinic in 4 months.

## 2024-05-08 NOTE — ASSESSMENT & PLAN NOTE
Well-controlled at this time, BP here today is 128/77.  He is fully compliant with his medications.   - Continues to take Amlodipine 5 mg daily, Aldactone 25 mg daily, Benicar 40-25 daily  - Counseled on lifestyle modifications to ensure weight loss as patient is obese and this may contribute to BP in future

## 2024-05-08 NOTE — PROGRESS NOTES
[facility] Outpatient Internal Medicine Clinic    Andry Payne is a 63 y.o. male, here for evaluation of the following concerns:    HPI    Acute Superficial Thrombophlebitis  Was diagnosed in end of April after he presented with 1 week of leg welling. DVT study showed acute superficial thrombophelbitis in L great saphenous vein.   Much less leg swelling today compared to previous visit. States he can sometimes feel the pain if he hits the leg in that area however overall has improved greatly.     HTN  /77.   Continues to take Amlodipine 5 mg daily, Aldactone 25 mg daily, Benicar 40-25 daily.     BPH   Has some urgency. Denies hesitancy.   No other urinary issues.    Review of Systems  Per HPI.     MEDICATIONS:  Prior to Visit Medications    Medication Sig Taking? Authorizing Provider   amLODIPine (NORVASC) 5 MG tablet Take 1 tablet by mouth daily Yes Gemini Severino MD   spironolactone (ALDACTONE) 25 MG tablet TAKE 1 TABLET BY MOUTH DAILY Yes Nirali Darnell MD   atorvastatin (LIPITOR) 20 MG tablet Take 1 tablet by mouth daily Yes Aly Monsivais MD   olmesartan-hydroCHLOROthiazide (BENICAR HCT) 40-25 MG per tablet Take 1 tablet by mouth daily Yes Khan, Behram Ahmed, MD   tamsulosin (FLOMAX) 0.4 MG capsule Take 1 capsule by mouth daily Yes ProviderAlisha MD   sildenafil (VIAGRA) 100 MG tablet Take 1 tablet by mouth as needed for Erectile Dysfunction Yes KAYLA Keane MD      Vitals:    05/08/24 1011   BP: 128/77   Site: Left Upper Arm   Position: Sitting   Cuff Size: Large Adult   Pulse: 78   Resp: 20   Temp: 97.3 °F (36.3 °C)   TempSrc: Temporal   SpO2: 96%   Weight: 114.8 kg (253 lb 1.6 oz)   Height: 1.829 m (6')      Estimated body mass index is 34.33 kg/m² as calculated from the following:    Height as of this encounter: 1.829 m (6').    Weight as of this encounter: 114.8 kg (253 lb 1.6 oz).    Physical Exam  Constitutional:       Appearance: Normal appearance.   HENT:

## 2024-06-07 DIAGNOSIS — E88.810 METABOLIC SYNDROME: ICD-10-CM

## 2024-06-07 DIAGNOSIS — R73.03 PRE-DIABETES: ICD-10-CM

## 2024-06-07 DIAGNOSIS — R39.16 BENIGN PROSTATIC HYPERPLASIA (BPH) WITH STRAINING ON URINATION: ICD-10-CM

## 2024-06-07 DIAGNOSIS — Z12.5 PROSTATE CANCER SCREENING: ICD-10-CM

## 2024-06-07 DIAGNOSIS — I10 PRIMARY HYPERTENSION: ICD-10-CM

## 2024-06-07 DIAGNOSIS — E78.5 HYPERLIPIDEMIA, UNSPECIFIED HYPERLIPIDEMIA TYPE: ICD-10-CM

## 2024-06-07 DIAGNOSIS — N40.1 BENIGN PROSTATIC HYPERPLASIA (BPH) WITH STRAINING ON URINATION: ICD-10-CM

## 2024-06-07 LAB
25(OH)D3 SERPL-MCNC: 54.1 NG/ML
BASOPHILS # BLD: 0.1 K/UL (ref 0–0.2)
BASOPHILS NFR BLD: 0.6 %
DEPRECATED RDW RBC AUTO: 13.3 % (ref 12.4–15.4)
EOSINOPHIL # BLD: 0.4 K/UL (ref 0–0.6)
EOSINOPHIL NFR BLD: 4.5 %
HCT VFR BLD AUTO: 39.6 % (ref 40.5–52.5)
HGB BLD-MCNC: 13.4 G/DL (ref 13.5–17.5)
LYMPHOCYTES # BLD: 2.4 K/UL (ref 1–5.1)
LYMPHOCYTES NFR BLD: 28.1 %
MCH RBC QN AUTO: 29.3 PG (ref 26–34)
MCHC RBC AUTO-ENTMCNC: 33.7 G/DL (ref 31–36)
MCV RBC AUTO: 86.9 FL (ref 80–100)
MONOCYTES # BLD: 0.5 K/UL (ref 0–1.3)
MONOCYTES NFR BLD: 6.1 %
NEUTROPHILS # BLD: 5.3 K/UL (ref 1.7–7.7)
NEUTROPHILS NFR BLD: 60.7 %
PLATELET # BLD AUTO: 305 K/UL (ref 135–450)
PMV BLD AUTO: 8.9 FL (ref 5–10.5)
PSA SERPL DL<=0.01 NG/ML-MCNC: 0.58 NG/ML (ref 0–4)
RBC # BLD AUTO: 4.56 M/UL (ref 4.2–5.9)
TSH SERPL DL<=0.005 MIU/L-ACNC: 3.55 UIU/ML (ref 0.27–4.2)
WBC # BLD AUTO: 8.7 K/UL (ref 4–11)

## 2024-06-08 LAB
ALBUMIN SERPL-MCNC: 4.3 G/DL (ref 3.4–5)
ALBUMIN/GLOB SERPL: 1.5 {RATIO} (ref 1.1–2.2)
ALP SERPL-CCNC: 83 U/L (ref 40–129)
ALT SERPL-CCNC: 35 U/L (ref 10–40)
ANION GAP SERPL CALCULATED.3IONS-SCNC: 9 MMOL/L (ref 3–16)
AST SERPL-CCNC: 43 U/L (ref 15–37)
BILIRUB SERPL-MCNC: 0.6 MG/DL (ref 0–1)
BUN SERPL-MCNC: 14 MG/DL (ref 7–20)
CALCIUM SERPL-MCNC: 9.9 MG/DL (ref 8.3–10.6)
CHLORIDE SERPL-SCNC: 103 MMOL/L (ref 99–110)
CHOLEST SERPL-MCNC: 134 MG/DL (ref 0–199)
CO2 SERPL-SCNC: 28 MMOL/L (ref 21–32)
CREAT SERPL-MCNC: 1.1 MG/DL (ref 0.8–1.3)
EST. AVERAGE GLUCOSE BLD GHB EST-MCNC: 128.4 MG/DL
GFR SERPLBLD CREATININE-BSD FMLA CKD-EPI: 75 ML/MIN/{1.73_M2}
GLUCOSE SERPL-MCNC: 89 MG/DL (ref 70–99)
HBA1C MFR BLD: 6.1 %
HDLC SERPL-MCNC: 38 MG/DL (ref 40–60)
LDLC SERPL CALC-MCNC: 73 MG/DL
POTASSIUM SERPL-SCNC: 4.4 MMOL/L (ref 3.5–5.1)
PROT SERPL-MCNC: 7.2 G/DL (ref 6.4–8.2)
SODIUM SERPL-SCNC: 140 MMOL/L (ref 136–145)
TRIGL SERPL-MCNC: 116 MG/DL (ref 0–150)
VLDLC SERPL CALC-MCNC: 23 MG/DL

## 2024-06-10 NOTE — RESULT ENCOUNTER NOTE
Informed patient of lab results. Patient expressed understanding. Is working on getting his colonoscopy. Will follow up at next appointment in September.

## 2024-06-13 ENCOUNTER — HOSPITAL ENCOUNTER (EMERGENCY)
Age: 64
Discharge: HOME | End: 2024-06-14
Attending: STUDENT IN AN ORGANIZED HEALTH CARE EDUCATION/TRAINING PROGRAM
Payer: COMMERCIAL

## 2024-06-13 DIAGNOSIS — M21.42 PES PLANUS OF LEFT FOOT: ICD-10-CM

## 2024-06-13 DIAGNOSIS — M79.672 LEFT FOOT PAIN: Primary | ICD-10-CM

## 2024-06-13 DIAGNOSIS — M72.2 PLANTAR FASCIITIS OF LEFT FOOT: ICD-10-CM

## 2024-06-13 DIAGNOSIS — B35.3 TINEA PEDIS, LEFT: ICD-10-CM

## 2024-06-13 PROCEDURE — 99283 EMERGENCY DEPT VISIT LOW MDM: CPT

## 2024-06-14 VITALS
DIASTOLIC BLOOD PRESSURE: 88 MMHG | OXYGEN SATURATION: 92 % | SYSTOLIC BLOOD PRESSURE: 135 MMHG | RESPIRATION RATE: 16 BRPM | WEIGHT: 257.06 LBS | TEMPERATURE: 97.1 F | HEIGHT: 72 IN | HEART RATE: 81 BPM | BODY MASS INDEX: 34.82 KG/M2

## 2024-06-14 PROCEDURE — 6370000000 HC RX 637 (ALT 250 FOR IP): Performed by: STUDENT IN AN ORGANIZED HEALTH CARE EDUCATION/TRAINING PROGRAM

## 2024-06-14 RX ORDER — ACETAMINOPHEN 500 MG
1000 TABLET ORAL 3 TIMES DAILY PRN
Qty: 180 TABLET | Refills: 0 | Status: SHIPPED | OUTPATIENT
Start: 2024-06-14

## 2024-06-14 RX ORDER — CYCLOBENZAPRINE HCL 10 MG
10 TABLET ORAL ONCE
Status: COMPLETED | OUTPATIENT
Start: 2024-06-14 | End: 2024-06-14

## 2024-06-14 RX ORDER — CYCLOBENZAPRINE HCL 10 MG
10 TABLET ORAL ONCE
Status: DISCONTINUED | OUTPATIENT
Start: 2024-06-14 | End: 2024-06-14

## 2024-06-14 RX ORDER — CYCLOBENZAPRINE HCL 10 MG
10 TABLET ORAL 3 TIMES DAILY PRN
Qty: 21 TABLET | Refills: 0 | Status: SHIPPED | OUTPATIENT
Start: 2024-06-14 | End: 2024-06-24

## 2024-06-14 RX ORDER — ACETAMINOPHEN 500 MG
1000 TABLET ORAL ONCE
Status: COMPLETED | OUTPATIENT
Start: 2024-06-14 | End: 2024-06-14

## 2024-06-14 RX ORDER — PRENATAL VIT 91/IRON/FOLIC/DHA 28-975-200
COMBINATION PACKAGE (EA) ORAL
Qty: 15 G | Refills: 0 | Status: SHIPPED | OUTPATIENT
Start: 2024-06-14

## 2024-06-14 RX ORDER — IBUPROFEN 600 MG/1
600 TABLET ORAL 4 TIMES DAILY PRN
Qty: 40 TABLET | Refills: 0 | Status: SHIPPED | OUTPATIENT
Start: 2024-06-14

## 2024-06-14 RX ORDER — ACETAMINOPHEN 500 MG
1000 TABLET ORAL ONCE
Status: DISCONTINUED | OUTPATIENT
Start: 2024-06-14 | End: 2024-06-14

## 2024-06-14 RX ADMIN — ACETAMINOPHEN 1000 MG: 500 TABLET ORAL at 00:40

## 2024-06-14 RX ADMIN — CYCLOBENZAPRINE 10 MG: 10 TABLET, FILM COATED ORAL at 00:40

## 2024-06-14 ASSESSMENT — PAIN DESCRIPTION - LOCATION: LOCATION: FOOT

## 2024-06-14 ASSESSMENT — PAIN SCALES - GENERAL: PAINLEVEL_OUTOF10: 8

## 2024-06-14 ASSESSMENT — LIFESTYLE VARIABLES
HOW OFTEN DO YOU HAVE A DRINK CONTAINING ALCOHOL: 2-4 TIMES A MONTH
HOW MANY STANDARD DRINKS CONTAINING ALCOHOL DO YOU HAVE ON A TYPICAL DAY: 1 OR 2

## 2024-06-14 ASSESSMENT — PAIN DESCRIPTION - ORIENTATION: ORIENTATION: LEFT

## 2024-06-14 NOTE — ED PROVIDER NOTES
[facility] EMERGENCY DEPARTMENT  EMERGENCY DEPARTMENT ENCOUNTER        Pt Name: Andry Payne  MRN: [id number]  Birthdate 1960  Date of evaluation: 6/13/2024  Provider: Luisito Ritchie MD  PCP: Hermes Ernandez MD  Note Started: 8:50 AM EDT 6/14/24    CHIEF COMPLAINT       Chief Complaint   Patient presents with    Foot Pain     Left foot pain, bottom of foot. Pain described as sharp and intermittent. Started yesterday. Nothing seems to make better or worse. Pain rates 8/10       HISTORY OF PRESENT ILLNESS: 1 or more Elements     {History from (Optional):30977}    {Limitations to history (Optional):28881}    Andry Payne is a 63 y.o. male who presents ***    Nursing Notes were all reviewed and agreed with or any disagreements were addressed in the HPI.    REVIEW OF SYSTEMS :      Positives and Pertinent negatives as per HPI.  ROS otherwise unremarkable.    SURGICAL HISTORY   No past surgical history on file.    CURRENTMEDICATIONS       Discharge Medication List as of 6/14/2024  1:22 AM        CONTINUE these medications which have NOT CHANGED    Details   amLODIPine (NORVASC) 5 MG tablet Take 1 tablet by mouth daily, Disp-30 tablet, R-5Normal      spironolactone (ALDACTONE) 25 MG tablet TAKE 1 TABLET BY MOUTH DAILY, Disp-30 tablet, R-5Normal      atorvastatin (LIPITOR) 20 MG tablet Take 1 tablet by mouth daily, Disp-90 tablet, R-3Normal      olmesartan-hydroCHLOROthiazide (BENICAR HCT) 40-25 MG per tablet Take 1 tablet by mouth daily, Disp-90 tablet, R-3Normal      tamsulosin (FLOMAX) 0.4 MG capsule Take 1 capsule by mouth dailyHistorical Med      sildenafil (VIAGRA) 100 MG tablet Take 1 tablet by mouth as needed for Erectile Dysfunction, Disp-6 tablet, R-3             ALLERGIES     Patient has no known allergies.    FAMILYHISTORY     No family history on file.     SOCIAL HISTORY       Social History     Tobacco Use    Smoking status: Never    Smokeless tobacco: Never

## 2024-08-07 DIAGNOSIS — I10 PRIMARY HYPERTENSION: ICD-10-CM

## 2024-08-07 RX ORDER — SPIRONOLACTONE 25 MG/1
25 TABLET ORAL DAILY
Qty: 30 TABLET | Refills: 5 | Status: SHIPPED | OUTPATIENT
Start: 2024-08-07

## 2024-08-07 NOTE — TELEPHONE ENCOUNTER
Requested Prescriptions     Pending Prescriptions Disp Refills    spironolactone (ALDACTONE) 25 MG tablet 30 tablet 5     Sig: Take 1 tablet by mouth daily       Last Clinic Visit:  5/8/2024     Next Clinic Appointment:  9/4/2024

## 2024-08-15 RX ORDER — AMLODIPINE BESYLATE 5 MG/1
5 TABLET ORAL DAILY
Qty: 30 TABLET | Refills: 5 | Status: SHIPPED | OUTPATIENT
Start: 2024-08-15

## 2024-08-15 NOTE — TELEPHONE ENCOUNTER
Requested Prescriptions     Pending Prescriptions Disp Refills    amLODIPine (NORVASC) 5 MG tablet [Pharmacy Med Name: AMLODIPINE BESYLATE 5MG TABLETS] 30 tablet 5     Sig: TAKE 1 TABLET BY MOUTH DAILY       Last Clinic Visit:  5/8/2024     Next Clinic Appointment:  9/4/2024

## 2024-10-07 DIAGNOSIS — I10 PRIMARY HYPERTENSION: ICD-10-CM

## 2024-10-07 RX ORDER — OLMESARTAN MEDOXOMIL AND HYDROCHLOROTHIAZIDE 40/25 40; 25 MG/1; MG/1
1 TABLET ORAL DAILY
Qty: 30 TABLET | Refills: 0 | Status: SHIPPED | OUTPATIENT
Start: 2024-10-07

## 2024-10-07 RX ORDER — OLMESARTAN MEDOXOMIL AND HYDROCHLOROTHIAZIDE 40/25 40; 25 MG/1; MG/1
1 TABLET ORAL DAILY
Qty: 90 TABLET | OUTPATIENT
Start: 2024-10-07

## 2024-10-07 NOTE — TELEPHONE ENCOUNTER
Requested Prescriptions     Pending Prescriptions Disp Refills    olmesartan-hydroCHLOROthiazide (BENICAR HCT) 40-25 MG per tablet 90 tablet 3     Sig: Take 1 tablet by mouth daily       Last Clinic Visit:  5/8/2024     Next Clinic Appointment:  Visit date not found

## 2024-10-14 SDOH — ECONOMIC STABILITY: FOOD INSECURITY: WITHIN THE PAST 12 MONTHS, YOU WORRIED THAT YOUR FOOD WOULD RUN OUT BEFORE YOU GOT MONEY TO BUY MORE.: NEVER TRUE

## 2024-10-14 SDOH — ECONOMIC STABILITY: FOOD INSECURITY: WITHIN THE PAST 12 MONTHS, THE FOOD YOU BOUGHT JUST DIDN'T LAST AND YOU DIDN'T HAVE MONEY TO GET MORE.: NEVER TRUE

## 2024-10-14 SDOH — ECONOMIC STABILITY: INCOME INSECURITY: HOW HARD IS IT FOR YOU TO PAY FOR THE VERY BASICS LIKE FOOD, HOUSING, MEDICAL CARE, AND HEATING?: NOT HARD AT ALL

## 2024-10-15 ENCOUNTER — OFFICE VISIT (OUTPATIENT)
Dept: INTERNAL MEDICINE CLINIC | Age: 64
End: 2024-10-15
Payer: COMMERCIAL

## 2024-10-15 VITALS
HEART RATE: 88 BPM | WEIGHT: 248 LBS | RESPIRATION RATE: 18 BRPM | OXYGEN SATURATION: 97 % | DIASTOLIC BLOOD PRESSURE: 79 MMHG | SYSTOLIC BLOOD PRESSURE: 124 MMHG | BODY MASS INDEX: 33.63 KG/M2 | TEMPERATURE: 97 F

## 2024-10-15 DIAGNOSIS — I10 PRIMARY HYPERTENSION: ICD-10-CM

## 2024-10-15 DIAGNOSIS — E88.810 METABOLIC SYNDROME: ICD-10-CM

## 2024-10-15 DIAGNOSIS — C61 PROSTATE CANCER (HCC): ICD-10-CM

## 2024-10-15 DIAGNOSIS — J22 LOWER RESPIRATORY INFECTION: Primary | ICD-10-CM

## 2024-10-15 DIAGNOSIS — R73.03 PRE-DIABETES: ICD-10-CM

## 2024-10-15 PROCEDURE — 99213 OFFICE O/P EST LOW 20 MIN: CPT

## 2024-10-15 RX ORDER — SILDENAFIL CITRATE 20 MG/1
20 TABLET ORAL DAILY
COMMUNITY

## 2024-10-15 ASSESSMENT — ENCOUNTER SYMPTOMS
SHORTNESS OF BREATH: 1
COUGH: 1

## 2024-10-15 NOTE — PROGRESS NOTES
[facility]    Andry Payne is a 64 y.o. male with a history of prostate cancer (s/p CyberKnife), LOLIS, hypertension, hyperlipidemia, along with metabolic syndrome including prediabetes, here for evaluation of the following concerns:    HPI  Lower respiratory illness:  Chief complaint was fever and myalgias, along with cough, pleuritic right chest pain, and some shortness of breath.  Was seen in urgent care, flu and COVID-negative, given Augmentin for 7 days along with guaifenesin for 5 days.  Patient finished his antibiotic course, states that he is breathing better and feeling better overall, still with minimal cough and pleurisy with deep breath.    Metabolic syndrome:  Try to manage with diet.  Denies fatigue, vision changes, paresthesias.    History of prostate cancer:  Follows with urology, takes 20 mg sildenafil daily and up to 100 mg PRN for intercourse.  PSA stable.  Denies any hematuria, difficulty urinating, back pain, or unexpected weight changes.    Review of Systems   Constitutional:  Positive for chills and fever. Negative for unexpected weight change.   Respiratory:  Positive for cough and shortness of breath.    Cardiovascular:  Positive for chest pain. Negative for palpitations and leg swelling.   Genitourinary: Negative.        MEDICATIONS:  Prior to Visit Medications    Medication Sig Taking? Authorizing Provider   sildenafil (REVATIO) 20 MG tablet Take 1 tablet by mouth daily Yes ProviderAlisha MD   olmesartan-hydroCHLOROthiazide (BENICAR HCT) 40-25 MG per tablet Take 1 tablet by mouth daily Yes Nevaeh Marie MD   amLODIPine (NORVASC) 5 MG tablet TAKE 1 TABLET BY MOUTH DAILY Yes Claudy Bell MD   spironolactone (ALDACTONE) 25 MG tablet Take 1 tablet by mouth daily Yes Ishan Monsalve MD   acetaminophen (TYLENOL) 500 MG tablet Take 2 tablets by mouth 3 times daily as needed for Pain Yes Luisito Ritchie MD   atorvastatin

## 2024-10-15 NOTE — PATIENT INSTRUCTIONS
Continue taking your meds as prescribed  Continue to try to lose some weight  Glad you're feeling better!

## 2024-10-16 NOTE — ASSESSMENT & PLAN NOTE
Acute condition, new, Follow up in 1 week or as needed.  Flu/COVID-negative  Rales at right lower lung field  Status post 7 days of Augmentin  If not improved, obtain chest x-ray, otherwise obtain chest x-ray in 5 weeks from now to assure clearance

## 2024-10-16 NOTE — ASSESSMENT & PLAN NOTE
Chronic, well-controlled at this time  BP here today is 124/79.  He is fully compliant with his medications.   - Continues to take Amlodipine 5 mg daily, Aldactone 25 mg daily, Benicar 40-25 daily  - Counseled on lifestyle modifications to ensure weight loss as patient is obese and this may contribute to BP in future    Unable to assess

## 2024-10-16 NOTE — ASSESSMENT & PLAN NOTE
Chronic, at goal (stable), continue current treatment plan  0.58 (6/7/2024), 0.50 (11/27/2023)  - Continue Flomax, continue daily

## 2024-10-16 NOTE — ASSESSMENT & PLAN NOTE
Chronic, at goal (stable), metformin offered, declined, and lifestyle modifications recommended  A1c improvin.1 (2024) from 6.4 and 6.4 (3//24, 8/3/2023)

## 2024-10-28 ENCOUNTER — OFFICE VISIT (OUTPATIENT)
Dept: INTERNAL MEDICINE CLINIC | Age: 64
End: 2024-10-28
Payer: COMMERCIAL

## 2024-10-28 VITALS
RESPIRATION RATE: 20 BRPM | TEMPERATURE: 97.2 F | WEIGHT: 249.2 LBS | SYSTOLIC BLOOD PRESSURE: 135 MMHG | BODY MASS INDEX: 33.75 KG/M2 | OXYGEN SATURATION: 97 % | HEART RATE: 73 BPM | HEIGHT: 72 IN | DIASTOLIC BLOOD PRESSURE: 70 MMHG

## 2024-10-28 DIAGNOSIS — J22 LOWER RESPIRATORY INFECTION: Primary | ICD-10-CM

## 2024-10-28 DIAGNOSIS — I10 PRIMARY HYPERTENSION: ICD-10-CM

## 2024-10-28 PROCEDURE — 99213 OFFICE O/P EST LOW 20 MIN: CPT

## 2024-10-28 NOTE — PROGRESS NOTES
[facility]    Andry Payne is a 64 y.o. male, here for evaluation of the following concerns described below:    HPI  Patient presents for follow-up for a lower respiratory infection. He reports symptoms started 10/2/24 and last for about a week. He reports that he has having fever (101), chills, malaise, and body aches. He also reports that he had loss of appetite but tried to continue drinking fluids. He denies any cough, shortness of breath, chest pain, nausea, vomiting, or diarrhea. He reports that he feels much better today. He denies any symptoms.     Review of Systems - A 10 point review of systems was conducted and significant findings noted in HPI.    MEDICATIONS:  Prior to Visit Medications    Medication Sig Taking? Authorizing Provider   sildenafil (REVATIO) 20 MG tablet Take 1 tablet by mouth daily Yes Alisha Griggs MD   olmesartan-hydroCHLOROthiazide (BENICAR HCT) 40-25 MG per tablet Take 1 tablet by mouth daily Yes Nevaeh Marie MD   amLODIPine (NORVASC) 5 MG tablet TAKE 1 TABLET BY MOUTH DAILY Yes Claudy Bell MD   spironolactone (ALDACTONE) 25 MG tablet Take 1 tablet by mouth daily Yes Ishan Monsalve MD   acetaminophen (TYLENOL) 500 MG tablet Take 2 tablets by mouth 3 times daily as needed for Pain Yes Luisito Ritchie MD   atorvastatin (LIPITOR) 20 MG tablet Take 1 tablet by mouth daily Yes Aly Monsivais MD   terbinafine (LAMISIL) 1 % cream Apply topically 2 times daily for 2 weeks.  Patient not taking: Reported on 10/15/2024  Luisito Ritchie MD   ibuprofen (ADVIL;MOTRIN) 600 MG tablet Take 1 tablet by mouth 4 times daily as needed for Pain  Patient not taking: Reported on 10/15/2024  Luisito Ritchie MD   tamsulosin (FLOMAX) 0.4 MG capsule Take 1 capsule by mouth daily  Patient not taking: Reported on 10/15/2024  Alisha Griggs MD        Vitals:    10/28/24 0946   BP: 135/70   Site: Right Upper Arm   Position:

## 2024-11-04 DIAGNOSIS — I10 PRIMARY HYPERTENSION: ICD-10-CM

## 2024-11-04 RX ORDER — OLMESARTAN MEDOXOMIL AND HYDROCHLOROTHIAZIDE 40/25 40; 25 MG/1; MG/1
1 TABLET ORAL DAILY
Qty: 30 TABLET | Refills: 0 | Status: SHIPPED | OUTPATIENT
Start: 2024-11-04

## 2024-11-04 NOTE — TELEPHONE ENCOUNTER
Requested Prescriptions     Pending Prescriptions Disp Refills    olmesartan-hydroCHLOROthiazide (BENICAR HCT) 40-25 MG per tablet [Pharmacy Med Name: OLMESARTAN MEDOX/HCTZ 40-25MG TAB] 30 tablet 0     Sig: TAKE 1 TABLET BY MOUTH DAILY       Last Clinic Visit:  10/28/2024     Next Clinic Appointment:  3/3/2025

## 2024-12-05 DIAGNOSIS — I10 PRIMARY HYPERTENSION: ICD-10-CM

## 2024-12-05 RX ORDER — OLMESARTAN MEDOXOMIL AND HYDROCHLOROTHIAZIDE 40/25 40; 25 MG/1; MG/1
1 TABLET ORAL DAILY
Qty: 30 TABLET | Refills: 2 | Status: SHIPPED | OUTPATIENT
Start: 2024-12-05

## 2024-12-05 NOTE — TELEPHONE ENCOUNTER
Requested Prescriptions     Pending Prescriptions Disp Refills    olmesartan-hydroCHLOROthiazide (BENICAR HCT) 40-25 MG per tablet 30 tablet 2     Sig: Take 1 tablet by mouth daily       Last Clinic Visit:  10/28/2024     Next Clinic Appointment:  3/3/2025

## 2024-12-23 DIAGNOSIS — I10 PRIMARY HYPERTENSION: ICD-10-CM

## 2024-12-23 RX ORDER — OLMESARTAN MEDOXOMIL AND HYDROCHLOROTHIAZIDE 40/25 40; 25 MG/1; MG/1
1 TABLET ORAL DAILY
Qty: 30 TABLET | Refills: 2 | Status: SHIPPED | OUTPATIENT
Start: 2024-12-23

## 2025-01-20 RX ORDER — ATORVASTATIN CALCIUM 20 MG/1
20 TABLET, FILM COATED ORAL DAILY
Qty: 90 TABLET | Refills: 3 | Status: SHIPPED | OUTPATIENT
Start: 2025-01-20

## 2025-01-20 NOTE — TELEPHONE ENCOUNTER
Requested Prescriptions     Pending Prescriptions Disp Refills    atorvastatin (LIPITOR) 20 MG tablet 90 tablet 3     Sig: Take 1 tablet by mouth daily       Last Clinic Visit:  10/28/2024     Next Clinic Appointment:  3/3/2025

## 2025-02-13 DIAGNOSIS — I10 PRIMARY HYPERTENSION: ICD-10-CM

## 2025-02-13 RX ORDER — SPIRONOLACTONE 25 MG/1
25 TABLET ORAL DAILY
Qty: 30 TABLET | Refills: 5 | Status: SHIPPED | OUTPATIENT
Start: 2025-02-13

## 2025-02-13 RX ORDER — AMLODIPINE BESYLATE 5 MG/1
5 TABLET ORAL DAILY
Qty: 30 TABLET | Refills: 5 | Status: SHIPPED | OUTPATIENT
Start: 2025-02-13

## 2025-02-13 NOTE — TELEPHONE ENCOUNTER
Requested Prescriptions     Pending Prescriptions Disp Refills    amLODIPine (NORVASC) 5 MG tablet [Pharmacy Med Name: AMLODIPINE BESYLATE 5MG TABLETS] 30 tablet 5     Sig: TAKE 1 TABLET BY MOUTH DAILY       Last Clinic Visit:  10/28/2024     Next Clinic Appointment:  3/3/2025

## 2025-02-13 NOTE — TELEPHONE ENCOUNTER
Requested Prescriptions     Pending Prescriptions Disp Refills    spironolactone (ALDACTONE) 25 MG tablet [Pharmacy Med Name: SPIRONOLACTONE 25MG TABLETS] 30 tablet 5     Sig: TAKE 1 TABLET BY MOUTH DAILY       Last Clinic Visit:  10/28/2024     Next Clinic Appointment:  2/13/2025

## 2025-02-17 SDOH — ECONOMIC STABILITY: FOOD INSECURITY: WITHIN THE PAST 12 MONTHS, THE FOOD YOU BOUGHT JUST DIDN'T LAST AND YOU DIDN'T HAVE MONEY TO GET MORE.: NEVER TRUE

## 2025-02-17 SDOH — ECONOMIC STABILITY: INCOME INSECURITY: IN THE LAST 12 MONTHS, WAS THERE A TIME WHEN YOU WERE NOT ABLE TO PAY THE MORTGAGE OR RENT ON TIME?: NO

## 2025-02-17 SDOH — ECONOMIC STABILITY: FOOD INSECURITY: WITHIN THE PAST 12 MONTHS, YOU WORRIED THAT YOUR FOOD WOULD RUN OUT BEFORE YOU GOT MONEY TO BUY MORE.: NEVER TRUE

## 2025-02-17 SDOH — ECONOMIC STABILITY: TRANSPORTATION INSECURITY
IN THE PAST 12 MONTHS, HAS THE LACK OF TRANSPORTATION KEPT YOU FROM MEDICAL APPOINTMENTS OR FROM GETTING MEDICATIONS?: NO

## 2025-02-17 ASSESSMENT — PATIENT HEALTH QUESTIONNAIRE - PHQ9
SUM OF ALL RESPONSES TO PHQ QUESTIONS 1-9: 0
1. LITTLE INTEREST OR PLEASURE IN DOING THINGS: NOT AT ALL
SUM OF ALL RESPONSES TO PHQ9 QUESTIONS 1 & 2: 0
SUM OF ALL RESPONSES TO PHQ QUESTIONS 1-9: 0
2. FEELING DOWN, DEPRESSED OR HOPELESS: NOT AT ALL
1. LITTLE INTEREST OR PLEASURE IN DOING THINGS: NOT AT ALL
2. FEELING DOWN, DEPRESSED OR HOPELESS: NOT AT ALL
SUM OF ALL RESPONSES TO PHQ9 QUESTIONS 1 & 2: 0

## 2025-02-18 ENCOUNTER — OFFICE VISIT (OUTPATIENT)
Dept: INTERNAL MEDICINE CLINIC | Age: 65
End: 2025-02-18
Payer: COMMERCIAL

## 2025-02-18 ENCOUNTER — HOSPITAL ENCOUNTER (OUTPATIENT)
Dept: GENERAL RADIOLOGY | Age: 65
Discharge: HOME | End: 2025-02-18
Payer: COMMERCIAL

## 2025-02-18 VITALS
RESPIRATION RATE: 16 BRPM | WEIGHT: 259.6 LBS | BODY MASS INDEX: 35.21 KG/M2 | TEMPERATURE: 97 F | DIASTOLIC BLOOD PRESSURE: 86 MMHG | SYSTOLIC BLOOD PRESSURE: 150 MMHG | OXYGEN SATURATION: 98 % | HEART RATE: 78 BPM

## 2025-02-18 DIAGNOSIS — M25.562 LEFT KNEE PAIN, UNSPECIFIED CHRONICITY: ICD-10-CM

## 2025-02-18 DIAGNOSIS — M25.562 LEFT KNEE PAIN, UNSPECIFIED CHRONICITY: Primary | ICD-10-CM

## 2025-02-18 PROCEDURE — 73562 X-RAY EXAM OF KNEE 3: CPT

## 2025-02-18 PROCEDURE — 99213 OFFICE O/P EST LOW 20 MIN: CPT

## 2025-02-18 RX ORDER — MELOXICAM 15 MG/1
15 TABLET ORAL DAILY
Qty: 30 TABLET | Refills: 0 | Status: SHIPPED | OUTPATIENT
Start: 2025-02-18

## 2025-02-18 NOTE — PROGRESS NOTES
The [facility]    Andry Payne is a 64 y.o. male, here for evaluation of the following concerns: Routine follow-up    HPI:  Patient is a 64-year-old male with a past medical history of prostate cancer and hypertension who comes in presenting with left knee pain.    Left knee pain started about 4 months ago around November.  Progressively worsened, starting off very mild and now has worsened to the point where knee pain is a sharp 8 out of 10 only with exertion.  No knee pain at rest.  Has difficulty getting up from seated positions.  Has knee pain in the middle of the night when tossing and turning in bed.  Denies any inciting event or trauma.  Went out on Thursday and was in a lot of pain, which prompted patient to call the clinic and make an appointment.     Physical exertion worsens the pain.  Denies any alleviating factors and does not have any pain improvement with Tylenol or ibuprofen.  Denies any fevers or chills.  Denies any knee swelling.  States pain is mostly on the inner side of his knee.  Normally lives an active lifestyle and frequently cycles.      Review of Systems  Per HPI    MEDICATIONS:  Prior to Visit Medications    Medication Sig Taking? Authorizing Provider   meloxicam (MOBIC) 15 MG tablet Take 1 tablet by mouth daily Yes Joseph Mayer MD   diclofenac sodium (VOLTAREN) 1 % GEL Apply 4 g topically 4 times daily Yes Joseph Mayer MD   spironolactone (ALDACTONE) 25 MG tablet TAKE 1 TABLET BY MOUTH DAILY Yes Annalise Mcintyre DO   amLODIPine (NORVASC) 5 MG tablet TAKE 1 TABLET BY MOUTH DAILY Yes Annalise Mcintyre DO   atorvastatin (LIPITOR) 20 MG tablet Take 1 tablet by mouth daily Yes Jagdeep Graves MD   olmesartan-hydroCHLOROthiazide (BENICAR HCT) 40-25 MG per tablet Take 1 tablet by mouth daily Yes Ishan Monsalve MD   sildenafil (REVATIO) 20 MG tablet Take 1 tablet by mouth daily  ProviderAlisha MD   terbinafine (LAMISIL) 1 %

## 2025-02-18 NOTE — PATIENT INSTRUCTIONS
Regarding your left knee pain:  - Please obtain an x-ray of the knee as soon as you are able.  - I have ordered Voltaren gel and meloxicam.  Please apply the gel to the knee and take meloxicam to see if it assists with the pain.  - I have also made a referral to an orthopedic doctor for left knee evaluation.    - Please make an appointment with the orthopedic doctor at your earliest convenience.  - We will see you in about 4 weeks time.  - Do not hesitate to call our office with any questions or concerns.

## 2025-03-18 RX ORDER — MELOXICAM 15 MG/1
15 TABLET ORAL DAILY
Qty: 30 TABLET | Refills: 0 | Status: SHIPPED | OUTPATIENT
Start: 2025-03-18 | End: 2025-04-14

## 2025-03-18 NOTE — TELEPHONE ENCOUNTER
Medications refilled per patient request after review of chart, medication dispensing history, and evaluation of continued need for medications.

## 2025-03-18 NOTE — TELEPHONE ENCOUNTER
Requested Prescriptions     Pending Prescriptions Disp Refills    meloxicam (MOBIC) 15 MG tablet [Pharmacy Med Name: MELOXICAM 15MG TABLETS] 30 tablet 0     Sig: TAKE 1 TABLET BY MOUTH DAILY       Last Clinic Visit:  2/18/2025     Next Clinic Appointment:  Visit date not found

## 2025-03-27 DIAGNOSIS — I10 PRIMARY HYPERTENSION: ICD-10-CM

## 2025-03-27 RX ORDER — OLMESARTAN MEDOXOMIL AND HYDROCHLOROTHIAZIDE 40/25 40; 25 MG/1; MG/1
1 TABLET ORAL DAILY
Qty: 30 TABLET | Refills: 0 | Status: SHIPPED | OUTPATIENT
Start: 2025-03-27

## 2025-03-27 NOTE — TELEPHONE ENCOUNTER
Please call patient to schedule an appointment within the next 1-2 weeks for BP control. Medication refilled.

## 2025-03-27 NOTE — TELEPHONE ENCOUNTER
Requested Prescriptions     Pending Prescriptions Disp Refills    olmesartan-hydroCHLOROthiazide (BENICAR HCT) 40-25 MG per tablet 30 tablet 2     Sig: Take 1 tablet by mouth daily       Last Clinic Visit:  2/18/2025     Next Clinic Appointment:  Visit date not found

## 2025-04-14 ENCOUNTER — OFFICE VISIT (OUTPATIENT)
Dept: INTERNAL MEDICINE CLINIC | Age: 65
End: 2025-04-14

## 2025-04-14 VITALS
OXYGEN SATURATION: 95 % | BODY MASS INDEX: 36.08 KG/M2 | WEIGHT: 266.4 LBS | RESPIRATION RATE: 16 BRPM | HEIGHT: 72 IN | HEART RATE: 73 BPM | SYSTOLIC BLOOD PRESSURE: 133 MMHG | DIASTOLIC BLOOD PRESSURE: 86 MMHG | TEMPERATURE: 97.2 F

## 2025-04-14 DIAGNOSIS — I10 PRIMARY HYPERTENSION: Primary | ICD-10-CM

## 2025-04-14 DIAGNOSIS — R73.03 PRE-DIABETES: ICD-10-CM

## 2025-04-14 DIAGNOSIS — Z13.9 ENCOUNTER FOR HEALTH-RELATED SCREENING: ICD-10-CM

## 2025-04-14 DIAGNOSIS — E78.5 HYPERLIPIDEMIA, UNSPECIFIED HYPERLIPIDEMIA TYPE: ICD-10-CM

## 2025-04-14 DIAGNOSIS — N40.1 BENIGN PROSTATIC HYPERPLASIA (BPH) WITH STRAINING ON URINATION: ICD-10-CM

## 2025-04-14 DIAGNOSIS — E66.9 OBESITY (BMI 35.0-39.9 WITHOUT COMORBIDITY): ICD-10-CM

## 2025-04-14 DIAGNOSIS — C61 PROSTATE CANCER (HCC): ICD-10-CM

## 2025-04-14 DIAGNOSIS — R39.16 BENIGN PROSTATIC HYPERPLASIA (BPH) WITH STRAINING ON URINATION: ICD-10-CM

## 2025-04-14 PROCEDURE — 99213 OFFICE O/P EST LOW 20 MIN: CPT

## 2025-04-14 RX ORDER — ATORVASTATIN CALCIUM 20 MG/1
20 TABLET, FILM COATED ORAL DAILY
Qty: 90 TABLET | Refills: 3 | Status: SHIPPED | OUTPATIENT
Start: 2025-04-14

## 2025-04-14 RX ORDER — AMLODIPINE BESYLATE 5 MG/1
5 TABLET ORAL DAILY
Qty: 30 TABLET | Refills: 5 | Status: SHIPPED | OUTPATIENT
Start: 2025-04-14

## 2025-04-14 RX ORDER — OLMESARTAN MEDOXOMIL AND HYDROCHLOROTHIAZIDE 40/25 40; 25 MG/1; MG/1
1 TABLET ORAL DAILY
Qty: 90 TABLET | Refills: 3 | Status: SHIPPED | OUTPATIENT
Start: 2025-04-14

## 2025-04-14 RX ORDER — SPIRONOLACTONE 25 MG/1
25 TABLET ORAL DAILY
Qty: 30 TABLET | Refills: 5 | Status: SHIPPED | OUTPATIENT
Start: 2025-04-14

## 2025-04-14 NOTE — PROGRESS NOTES
Outpatient Clinic Established Patient Note    Patient: Andry Payne  : 1960 (64 y.o.)  Date: 2025    CC: Routine follow up.      HPI: Andry Payne is a 64 y.o. year old male with a medical history significant for prostate cancer s/p CyberKnife, LOLIS, HTN, HLD, prediabetes, osteoarthritis who presents today for routine follow up.    Mr. Payne has been well. Last visit with us was a couple months ago, seen for left knee pain. Had an Xray, showed OA. His BP was also elevated at that time.    His knee is feeling improved from last time. He has been using Voltaren gel on his knee with good effect. He has been able to exercise, including riding his bike, with some pain, but states it actually improved the day after he rides his bike.    BP is also better today, last visit his systolics were in the 140-150 range, likely due to his uncontrolled knee pain at that time.     Home Meds:  Prior to Visit Medications    Medication Sig Taking? Authorizing Provider   olmesartan-hydroCHLOROthiazide (BENICAR HCT) 40-25 MG per tablet Take 1 tablet by mouth daily Yes Avelina Clinton MD   diclofenac sodium (VOLTAREN) 1 % GEL Apply 4 g topically 4 times daily Yes Joseph Mayer MD   spironolactone (ALDACTONE) 25 MG tablet TAKE 1 TABLET BY MOUTH DAILY Yes Annalise Mcintyre DO   amLODIPine (NORVASC) 5 MG tablet TAKE 1 TABLET BY MOUTH DAILY Yes Annalise Mcintyre DO   atorvastatin (LIPITOR) 20 MG tablet Take 1 tablet by mouth daily Yes Jagdeep Graves MD   acetaminophen (TYLENOL) 500 MG tablet Take 2 tablets by mouth 3 times daily as needed for Pain Yes Luisito Ritchie MD       Allergies:    Patient has no known allergies.    Health Maintenance Due   Topic Date Due    HIV screen  Never done    Colorectal Cancer Screen  Never done    Shingles vaccine (1 of 2) Never done    Pneumococcal 50+ years Vaccine (1 of 1 - PCV) Never done    Respiratory Syncytial Virus (RSV) Pregnant or age 60 yrs+ (1 - Risk 60-74

## 2025-04-14 NOTE — PATIENT INSTRUCTIONS
Continue using Voltaren gel and heat packs for your knee as needed. Continue your current exercise plan.    Have your ordered blood work done in June.    Come back and see us in about 3 months    Thank you for coming in to see us today, I hope you have a great rest of your week!

## 2025-04-27 DIAGNOSIS — I10 PRIMARY HYPERTENSION: ICD-10-CM

## 2025-04-28 RX ORDER — OLMESARTAN MEDOXOMIL AND HYDROCHLOROTHIAZIDE 40/25 40; 25 MG/1; MG/1
1 TABLET ORAL DAILY
Qty: 30 TABLET | OUTPATIENT
Start: 2025-04-28

## 2025-04-28 NOTE — TELEPHONE ENCOUNTER
Refill refused d/t duplicate request. Just had this med refilled on 4/14 for 90 tabs x 3 refills  
Requested Prescriptions     Pending Prescriptions Disp Refills    olmesartan-hydroCHLOROthiazide (BENICAR HCT) 40-25 MG per tablet [Pharmacy Med Name: OLMESARTAN MEDOX/HCTZ 40-25MG TAB] 30 tablet      Sig: TAKE 1 TABLET BY MOUTH DAILY       Last Clinic Visit:  4/14/2025     Next Clinic Appointment:  Visit date not found  
63